# Patient Record
Sex: MALE | Race: BLACK OR AFRICAN AMERICAN | NOT HISPANIC OR LATINO
[De-identification: names, ages, dates, MRNs, and addresses within clinical notes are randomized per-mention and may not be internally consistent; named-entity substitution may affect disease eponyms.]

---

## 2019-03-25 ENCOUNTER — TRANSCRIPTION ENCOUNTER (OUTPATIENT)
Age: 44
End: 2019-03-25

## 2019-03-25 ENCOUNTER — APPOINTMENT (OUTPATIENT)
Dept: CT IMAGING | Facility: HOSPITAL | Age: 44
End: 2019-03-25

## 2019-03-25 ENCOUNTER — OUTPATIENT (OUTPATIENT)
Dept: OUTPATIENT SERVICES | Facility: HOSPITAL | Age: 44
LOS: 1 days | Discharge: HOME | End: 2019-03-25
Payer: OTHER MISCELLANEOUS

## 2019-03-25 ENCOUNTER — EMERGENCY (EMERGENCY)
Facility: HOSPITAL | Age: 44
LOS: 0 days | Discharge: ANOTHER HOSPITAL | End: 2019-03-25
Attending: EMERGENCY MEDICINE
Payer: OTHER MISCELLANEOUS

## 2019-03-25 ENCOUNTER — INPATIENT (INPATIENT)
Facility: HOSPITAL | Age: 44
LOS: 1 days | Discharge: HOME | DRG: 516 | End: 2019-03-27
Attending: SURGERY | Admitting: SURGERY
Payer: COMMERCIAL

## 2019-03-25 VITALS
DIASTOLIC BLOOD PRESSURE: 95 MMHG | TEMPERATURE: 98 F | OXYGEN SATURATION: 99 % | SYSTOLIC BLOOD PRESSURE: 160 MMHG | WEIGHT: 190.04 LBS | RESPIRATION RATE: 18 BRPM | HEART RATE: 68 BPM | HEIGHT: 71 IN

## 2019-03-25 VITALS
RESPIRATION RATE: 20 BRPM | SYSTOLIC BLOOD PRESSURE: 137 MMHG | OXYGEN SATURATION: 97 % | WEIGHT: 184.97 LBS | HEART RATE: 66 BPM | HEIGHT: 69 IN | TEMPERATURE: 98 F | DIASTOLIC BLOOD PRESSURE: 84 MMHG

## 2019-03-25 VITALS
RESPIRATION RATE: 17 BRPM | SYSTOLIC BLOOD PRESSURE: 141 MMHG | TEMPERATURE: 99 F | DIASTOLIC BLOOD PRESSURE: 83 MMHG | OXYGEN SATURATION: 99 % | HEART RATE: 62 BPM
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
## 2019-03-25 DIAGNOSIS — S32.10XA UNSPECIFIED FRACTURE OF SACRUM, INITIAL ENCOUNTER FOR CLOSED FRACTURE: ICD-10-CM

## 2019-03-25 DIAGNOSIS — S32.502A UNSPECIFIED FRACTURE OF LEFT PUBIS, INITIAL ENCOUNTER FOR CLOSED FRACTURE: ICD-10-CM

## 2019-03-25 DIAGNOSIS — M25.559 PAIN IN UNSPECIFIED HIP: ICD-10-CM
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
## 2019-03-25 DIAGNOSIS — S32.9XXA FRACTURE OF UNSPECIFIED PARTS OF LUMBOSACRAL SPINE AND PELVIS, INITIAL ENCOUNTER FOR CLOSED FRACTURE: ICD-10-CM

## 2019-03-25 DIAGNOSIS — S32.009A UNSPECIFIED FRACTURE OF UNSPECIFIED LUMBAR VERTEBRA, INITIAL ENCOUNTER FOR CLOSED FRACTURE: ICD-10-CM

## 2019-03-25 DIAGNOSIS — M54.5 LOW BACK PAIN: ICD-10-CM

## 2019-03-25 PROBLEM — Z00.00 ENCOUNTER FOR PREVENTIVE HEALTH EXAMINATION: Status: ACTIVE | Noted: 2019-03-25

## 2019-03-25 LAB
ALBUMIN SERPL ELPH-MCNC: 3.8 G/DL — SIGNIFICANT CHANGE UP (ref 3.3–5)
ALP SERPL-CCNC: 154 U/L — HIGH (ref 40–120)
ALT FLD-CCNC: 27 U/L — SIGNIFICANT CHANGE UP (ref 12–78)
ANION GAP SERPL CALC-SCNC: 3 MMOL/L — LOW (ref 5–17)
APTT BLD: 30 SEC — SIGNIFICANT CHANGE UP (ref 28.5–37)
APTT BLD: 32.5 SEC — SIGNIFICANT CHANGE UP (ref 27.5–36.3)
AST SERPL-CCNC: 20 U/L — SIGNIFICANT CHANGE UP (ref 15–37)
BASOPHILS # BLD AUTO: 0.03 K/UL — SIGNIFICANT CHANGE UP (ref 0–0.2)
BASOPHILS NFR BLD AUTO: 0.4 % — SIGNIFICANT CHANGE UP (ref 0–2)
BILIRUB SERPL-MCNC: 0.5 MG/DL — SIGNIFICANT CHANGE UP (ref 0.2–1.2)
BUN SERPL-MCNC: 16 MG/DL — SIGNIFICANT CHANGE UP (ref 7–23)
CALCIUM SERPL-MCNC: 8.4 MG/DL — LOW (ref 8.5–10.1)
CHLORIDE SERPL-SCNC: 109 MMOL/L — HIGH (ref 96–108)
CO2 SERPL-SCNC: 30 MMOL/L — SIGNIFICANT CHANGE UP (ref 22–31)
CREAT SERPL-MCNC: 1.05 MG/DL — SIGNIFICANT CHANGE UP (ref 0.5–1.3)
EOSINOPHIL # BLD AUTO: 0.13 K/UL — SIGNIFICANT CHANGE UP (ref 0–0.5)
EOSINOPHIL NFR BLD AUTO: 1.6 % — SIGNIFICANT CHANGE UP (ref 0–6)
GLUCOSE SERPL-MCNC: 93 MG/DL — SIGNIFICANT CHANGE UP (ref 70–99)
HCT VFR BLD CALC: 39.7 % — SIGNIFICANT CHANGE UP (ref 39–50)
HGB BLD-MCNC: 13.5 G/DL — SIGNIFICANT CHANGE UP (ref 13–17)
IMM GRANULOCYTES NFR BLD AUTO: 0.4 % — SIGNIFICANT CHANGE UP (ref 0–1.5)
INR BLD: 1 RATIO — SIGNIFICANT CHANGE UP (ref 0.88–1.16)
INR BLD: 1 RATIO — SIGNIFICANT CHANGE UP (ref 0.88–1.16)
LYMPHOCYTES # BLD AUTO: 2 K/UL — SIGNIFICANT CHANGE UP (ref 1–3.3)
LYMPHOCYTES # BLD AUTO: 24.9 % — SIGNIFICANT CHANGE UP (ref 13–44)
MCHC RBC-ENTMCNC: 31.6 PG — SIGNIFICANT CHANGE UP (ref 27–34)
MCHC RBC-ENTMCNC: 34 GM/DL — SIGNIFICANT CHANGE UP (ref 32–36)
MCV RBC AUTO: 93 FL — SIGNIFICANT CHANGE UP (ref 80–100)
MONOCYTES # BLD AUTO: 0.78 K/UL — SIGNIFICANT CHANGE UP (ref 0–0.9)
MONOCYTES NFR BLD AUTO: 9.7 % — SIGNIFICANT CHANGE UP (ref 2–14)
NEUTROPHILS # BLD AUTO: 5.06 K/UL — SIGNIFICANT CHANGE UP (ref 1.8–7.4)
NEUTROPHILS NFR BLD AUTO: 63 % — SIGNIFICANT CHANGE UP (ref 43–77)
NRBC # BLD: 0 /100 WBCS — SIGNIFICANT CHANGE UP (ref 0–0)
PLATELET # BLD AUTO: 199 K/UL — SIGNIFICANT CHANGE UP (ref 150–400)
POTASSIUM SERPL-MCNC: 4.3 MMOL/L — SIGNIFICANT CHANGE UP (ref 3.5–5.3)
POTASSIUM SERPL-SCNC: 4.3 MMOL/L — SIGNIFICANT CHANGE UP (ref 3.5–5.3)
PROT SERPL-MCNC: 7.1 GM/DL — SIGNIFICANT CHANGE UP (ref 6–8.3)
PROTHROM AB SERPL-ACNC: 11.2 SEC — SIGNIFICANT CHANGE UP (ref 10–12.9)
PROTHROM AB SERPL-ACNC: 11.5 SEC — SIGNIFICANT CHANGE UP (ref 10–12.9)
RBC # BLD: 4.27 M/UL — SIGNIFICANT CHANGE UP (ref 4.2–5.8)
RBC # FLD: 15.5 % — HIGH (ref 10.3–14.5)
SODIUM SERPL-SCNC: 142 MMOL/L — SIGNIFICANT CHANGE UP (ref 135–145)
WBC # BLD: 8.03 K/UL — SIGNIFICANT CHANGE UP (ref 3.8–10.5)
WBC # FLD AUTO: 8.03 K/UL — SIGNIFICANT CHANGE UP (ref 3.8–10.5)

## 2019-03-25 PROCEDURE — 99285 EMERGENCY DEPT VISIT HI MDM: CPT

## 2019-03-25 PROCEDURE — 72192 CT PELVIS W/O DYE: CPT | Mod: 26

## 2019-03-25 PROCEDURE — 99291 CRITICAL CARE FIRST HOUR: CPT

## 2019-03-25 PROCEDURE — 99253 IP/OBS CNSLTJ NEW/EST LOW 45: CPT

## 2019-03-25 PROCEDURE — 72131 CT LUMBAR SPINE W/O DYE: CPT | Mod: 26

## 2019-03-25 PROCEDURE — 99221 1ST HOSP IP/OBS SF/LOW 40: CPT | Mod: 57

## 2019-03-25 PROCEDURE — 71045 X-RAY EXAM CHEST 1 VIEW: CPT | Mod: 26

## 2019-03-25 PROCEDURE — 72170 X-RAY EXAM OF PELVIS: CPT | Mod: 26

## 2019-03-25 RX ORDER — SODIUM CHLORIDE 9 MG/ML
1000 INJECTION, SOLUTION INTRAVENOUS
Qty: 0 | Refills: 0 | Status: DISCONTINUED | OUTPATIENT
Start: 2019-03-25 | End: 2019-03-27

## 2019-03-25 RX ORDER — ACETAMINOPHEN 500 MG
650 TABLET ORAL EVERY 6 HOURS
Qty: 0 | Refills: 0 | Status: DISCONTINUED | OUTPATIENT
Start: 2019-03-25 | End: 2019-03-27

## 2019-03-25 RX ORDER — ENOXAPARIN SODIUM 100 MG/ML
30 INJECTION SUBCUTANEOUS EVERY 12 HOURS
Qty: 0 | Refills: 0 | Status: DISCONTINUED | OUTPATIENT
Start: 2019-03-25 | End: 2019-03-25

## 2019-03-25 RX ORDER — CEFAZOLIN SODIUM 1 G
2000 VIAL (EA) INJECTION ONCE
Qty: 0 | Refills: 0 | Status: DISCONTINUED | OUTPATIENT
Start: 2019-03-25 | End: 2019-03-27

## 2019-03-25 RX ORDER — NICOTINE POLACRILEX 2 MG
1 GUM BUCCAL DAILY
Qty: 0 | Refills: 0 | Status: DISCONTINUED | OUTPATIENT
Start: 2019-03-25 | End: 2019-03-27

## 2019-03-25 RX ORDER — OXYCODONE HYDROCHLORIDE 5 MG/1
5 TABLET ORAL EVERY 4 HOURS
Qty: 0 | Refills: 0 | Status: DISCONTINUED | OUTPATIENT
Start: 2019-03-25 | End: 2019-03-27

## 2019-03-25 RX ADMIN — OXYCODONE HYDROCHLORIDE 5 MILLIGRAM(S): 5 TABLET ORAL at 22:41

## 2019-03-25 NOTE — ED PROVIDER NOTE - PHYSICAL EXAMINATION
Gen: NAD, AOx3  Head: NCAT  HEENT: EOMI, oral mucosa moist, normal conjunctiva, neck supple  Lung: CTAB, no respiratory distress  CV: rrr, no murmur, Normal perfusion  Abd: soft, NTND, no CVA tenderness  MSK: No edema, no visible deformities, pelvis stable, +pain with pelvic compression, no midline spinal ttp  Neuro: No focal neurologic deficits  Skin: No rash   Psych: normal affect

## 2019-03-25 NOTE — ED ADULT NURSE NOTE - NSIMPLEMENTINTERV_GEN_ALL_ED
Implemented All Fall Risk Interventions:  [place] to call system. Call bell, personal items and telephone within reach. Instruct patient to call for assistance. Room bathroom lighting operational. Non-slip footwear when patient is off stretcher. Physically safe environment: no spills, clutter or unnecessary equipment. Stretcher in lowest position, wheels locked, appropriate side rails in place. Provide visual cue, wrist band, yellow gown, etc. Monitor gait and stability. Monitor for mental status changes and reorient to person, place, and time. Review medications for side effects contributing to fall risk. Reinforce activity limits and safety measures with patient and family.
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
## 2019-03-25 NOTE — H&P ADULT - NSHPPHYSICALEXAM_GEN_ALL_CORE
HEENT: Normocephalic, atraumatic, GLORIA, EOM wnl, no otorrhea or hemotympanum b/l, no epistaxis or d/c b/l nares, no craniofacial bony pathology or tenderness b/l  Neck: Pt in hard cervical collar at time of exam. No crepitus, no ecchymosis, no hematoma, to exam, no JVD, no tracheal deviation  Cspine/thoracolumbrosacral spine: no gross bony pathology or tenderness to exam  Cardiovascular: S1S2 Present  Chest: no gross rib pathology or tenderness to exam. No sternal pathology or tenderness to exam. No crepitus, no ecchymosis, no hematoma. No penetrating thorcoabdominal trauma  Respiratory: Rate is 18; Respiratory Effort normal; no wheezes, rales or rhonchi to exam  ABD: bowel sounds (+), soft, nontender, non distended, no rebound, no gaurding, no rigidity, no skin changes to exam. No pelvic instability to exam, no skin changes  Musculoskeletal: edema noted to left hip area, tender to palpation. unable to perform straight leg raise 2/2 to pain bilaterally   Vascular: Palpable bilateral DP/PT pulses  Skin: no lesions or rashes to exam HEENT: Normocephalic, atraumatic, GLORIA, EOM wnl, no otorrhea or hemotympanum b/l, no epistaxis or d/c b/l nares, no craniofacial bony pathology or tenderness b/l  Neck: Pt in hard cervical collar at time of exam. No crepitus, no ecchymosis, no hematoma, to exam, no JVD, no tracheal deviation  Cspine/thoracolumbrosacral spine: no gross bony pathology or tenderness to exam  Cardiovascular: S1S2 Present  Chest: no gross rib pathology or tenderness to exam. No sternal pathology or tenderness to exam. No crepitus, no ecchymosis, no hematoma. No penetrating thorcoabdominal trauma  Respiratory: Rate is 18; Respiratory Effort normal; no wheezes, rales or rhonchi to exam  ABD: bowel sounds (+), soft, nontender, non distended, no rebound, no guarding, no rigidity, no skin changes to exam. No pelvic instability to exam, no skin changes  Musculoskeletal: edema noted to left hip area, tender to palpation. unable to perform straight leg raise 2/2 to pain bilaterally   Vascular: Palpable bilateral DP/PT pulses  Skin: no lesions or rashes to exam

## 2019-03-25 NOTE — CONSULT NOTE ADULT - ASSESSMENT
44yo M w/ Sacral Fx, B/l Acetabulum Fx, R Sup/Inf [place] Fx  Ordered CT LSp  Pain Control  Pt s/e with Dr. Jennings at bedside, will advise of final plan pending imaging 42yo M w/ Sacral Fx, B/l Acetabulum Fx, R Sup/Inf [place] Fx, BL L5 TP Fx  Pain Control  NWB BLLE   DVT PPx: SCDs  Imaging reviewed with attending, significant for sacral fracture that would benefit from definitive operative fixation  Pt discussed with Dr. Godinez (Orthopaedic) at [facility] who agreed to do ED to ED transfer  Imaging findings discussed with patient at bedside who had adequate time to have all questions answered and agreed with above plan  Further orthopaedic surgical intervention to be performed at [facility]  Pt s/e with Dr. Jennings at bedside,who is aware and agrees with above plan  Ortho stable for DC/Transfer
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
## 2019-03-25 NOTE — CONSULT NOTE ADULT - ASSESSMENT
44yo M w/ Sacral Fx, B/l Acetabulum Fx, R Sup/Inf [place] Fx  Ordered CT LSp  Pain Control  Pt s/e with Dr. Jennings at bedside, will advise of final plan pending imaging 44yo M w/ Sacral Fx, B/l Acetabulum Fx, R Sup/Inf [place] Fx  Ordered CT LSp  Pain Control  Pt s/e with Dr. Jennings at bedside, will advise of final plan pending imaging
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
## 2019-03-25 NOTE — ED PROVIDER NOTE - NS ED ROS FT
ROS: no CP/SOB. no cough. no fever. no n/v/d/c. no abd pain. no rash. no bleeding. no urinary complaints. no weakness. no vision changes. no HA. +back pain. no extremity swelling/deformity. No change in mental status.

## 2019-03-25 NOTE — ED ADULT NURSE NOTE - NSIMPLEMENTINTERV_GEN_ALL_ED
Implemented All Universal Safety Interventions:  Starlight to call system. Call bell, personal items and telephone within reach. Instruct patient to call for assistance. Room bathroom lighting operational. Non-slip footwear when patient is off stretcher. Physically safe environment: no spills, clutter or unnecessary equipment. Stretcher in lowest position, wheels locked, appropriate side rails in place.
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
## 2019-03-25 NOTE — CONSULT NOTE ADULT - SUBJECTIVE AND OBJECTIVE BOX
Pt Name: MELINA DEE    MRN: [id number]      Patient is a 43y Male presenting to the emergency department with a chief complaint of b/l pelvic pain x 1 month. Pt states that 1 month ago he was at work when he was pinned between a truck tire and railing on both sides of his body. Pt has been ambulating with crutches since the injury with intermittent pain reported. Pt was seen at the [place] ED today where a CT showed multiple pelvic fractures/ L5 TP fxs and was transferred to [facility] ED for further evaluation. Pt otherwise reported mild tingling sensation of his right foot but otherwise denies fever/chills, c/p, sob, abdominal pain, n/v, numbness and has no other complaints.  .      REVIEW OF SYSTEMS    General: Alert, responsive, in NAD    Skin/Breast: No rashes, no pruritis   	  Ophthalmologic: No visual changes. No redness.   	  ENMT:	No discharge. No swelling.    Respiratory and Thorax: No difficulty breathing. No cough.  	   Cardiovascular:	No chest pain. No palpitations.    Gastrointestinal:	 No abdominal pain. No diarrhea.     Genitourinary: No dysuria. No bleeding.    Musculoskeletal: SEE HPI.    Neurological: No sensory or motor changes.     Psychiatric: No anxiety or depression.    Hematology/Lymphatics: No swelling.    Endocrine: No Hx of diabetes.    ROS is otherwise negative.    PAST MEDICAL & SURGICAL HISTORY:  PAST MEDICAL & SURGICAL HISTORY:  No pertinent past medical history  No significant past surgical history      Allergies: No Known Allergies      Medications: acetaminophen   Tablet .. 650 milliGRAM(s) Oral every 6 hours PRN  enoxaparin Injectable 30 milliGRAM(s) SubCutaneous every 12 hours  lactated ringers. 1000 milliLiter(s) IV Continuous <Continuous>  nicotine -  14 mG/24Hr(s) Patch 1 patch Transdermal daily  oxyCODONE    IR 5 milliGRAM(s) Oral every 4 hours PRN      FAMILY HISTORY:  : non-contributory    Social History: Denies ETOH abuse.    Ambulation: Walking independently [ x ] With Cane [ ] With Walker [ ]  Bedbound [ ]                           13.5   8.03  )-----------( 199      ( 25 Mar 2019 13:16 )             39.7       03-25    142  |  109<H>  |  16  ----------------------------<  93  4.3   |  30  |  1.05    Ca    8.4<L>      25 Mar 2019 13:16    TPro  7.1  /  Alb  3.8  /  TBili  0.5  /  DBili  x   /  AST  20  /  ALT  27  /  AlkPhos  154<H>  03-25      Vital Signs Last 24 Hrs  T(C): 36.7 (25 Mar 2019 19:31), Max: 37.1 (25 Mar 2019 18:40)  T(F): 98 (25 Mar 2019 19:31), Max: 98.8 (25 Mar 2019 18:40)  HR: 66 (25 Mar 2019 19:31) (56 - 68)  BP: 137/84 (25 Mar 2019 19:31) (137/84 - 160/95)  BP(mean): --  RR: 20 (25 Mar 2019 19:31) (17 - 20)  SpO2: 97% (25 Mar 2019 19:31) (97% - 99%)    Daily Height in cm: 175.26 (25 Mar 2019 19:31)    Daily       PHYSICAL EXAM:      Appearance: Alert, responsive, in no acute distress.    Neurological: Sensation is grossly intact to light touch. +tingling sensation reported on the right dorsal foot.     Skin: no rash on visible skin. Skin is clean, dry and intact. No bleeding. No abrasions. No ulcerations.    Vascular: 2+ distal radial/DP/PT pulses. Cap refill < 2 sec. No signs of venous insufficiency or stasis. No extremity ulcerations. No cyanosis.    Musculoskeletal:         Left Upper Extremity:  + NROM. Non-tender. No signs of trauma.        Right Upper Extremity:  + NROM. Non-tender. No signs of trauma.        Left Lower Extremity: +plantar/dorsiflexion/EHL/FHL intact, Decreased ROM of RLE due to reported pelvic pain, compartments soft and compressible, 2+ DP pulse, SILT.       Right Lower Extremity: +plantar/dorsiflexion/FHL intact, EHL NOT intact, Decreased ROM of RLE due to reported pelvic pain, compartments soft and compressible, 2+ DP pulse, SILT with mild tingling reported on the right foot dorsum.    Imaging Studies: All imaging reviewed by Dr. Godinez.    < from: CT Pelvis No Cont (03.25.19 @ 09:26) >  EXAM:  CT PELVIS ONLY                            PROCEDURE DATE:  03/25/2019          INTERPRETATION:  Exam Date: 3/25/2019 9:26 AM    CT bony pelvis         CLINICAL INFORMATION: Bony pelvis pain  R/O FRACTURE    TECHNIQUE: Contiguous axial CT sections of the pelvis were obtained.   Coronal and sagittal reformats were obtained.    FINDINGS:   No prior similar studies are available for review.    There are numerous acute fractures of the sacrum and pelvis as follows.     There is a comminuted sacral fracture involving the central portion of   the sacrum and involving he posterior elements of the sacrum along the   midline. There is suggestion of 2 small cortical fractures along the   posterior inferior endplate of L5. No bony retropulsion is present.    There are comminuted fractures of the bilateral anterior acetabuli. The   bilateral femoral heads remain located. No fractures of the visualized   portions of the femurs.    There is a distracted fracture of the left medial superior pubic ramus   adjacent to the symphysis pubis. There is a distracted fracture of the   left lateral inferior pubic ramus. There is a distracted fracture of the   right lateral inferior pubic ramus.    Bladder wall thickening is suggested. No free fluid orfree air within   the lower abdomen and pelvis. Visualized portions of the bowel appear   unremarkable.    There is a fluid collection within the deep soft tissues of the left hip,   lateral to the musculature, measuring approximately 1.7 cm in widthx 14   cm in cc dimension.    There is a small amount of posttraumatic inflammatory changes within the   subcutaneous tissues of the right lateral hip.    Impression:    Numerous acute comminuted fractures of the sacrum and pelvis, involving   the sacrum, inferior endplate of L5, bilateral acetabuli, bilateral   inferior pubic rami, and left superior pubic ramus, as detailed above.   Soft tissue changes as above.    Patient was sent to the ED for treatment.      Critical value:  I discussed the finding of this report with Dr. Jennings at 9:50 AM on March 25, 2019.  Critical value policy of the   hospital was followed.  Read back and confirmation of receipt of this   communication was performed.  This verbal communication supplements the  text report of this document.                                  SANDY TERRELL M.D., ATTENDING RADIOLOGIST  This document has been electronically signed. Mar 25 2019  9:54AM        < end of copied text >      < from: CT Lumbar Spine No Cont (03.25.19 @ 15:41) >    EXAM:  CT LUMBAR SPINE                          EXAM:  CT 3D RECONSTRUCT W HAIR                            PROCEDURE DATE:  03/25/2019          INTERPRETATION:  CT lumbar spine without contrast    History L5 radiculopathy    Please refer to the accompanying report of the pelvis for description of   pelvic fractures. There are tiny nondisplaced fractures of the transverse   processes of L5 on both sides.    There is no vertebral body fracture, compression, subluxation or pars   defect. The disc spaces are maintained. There is no CT evidence of focal   disc protrusion or spinal stenosis or epidural mass or collection. There   is mild facet arthropathy on the right at L4-L5.    IMPRESSION: Transverse process fractures at L5 without spinalcanal or   vertebral column abnormality. Please refer to accompanying report of the   pelvis                LANA PATRICIA M.D., ATTENDING RADIOLOGIST  This document has been electronically signed. Mar 25 2019  4:04PM    < end of copied text >      A/P:  Pt is a  43y Male transfer from [place] ED with multiple pelvic fractures & L5 TP fx as described in the CT scan report above x 1 month ago.    PLAN d/w Dr. Adkins:   * Pain control as clinically indicated  * MRI lumbar spine ordered  * Further plan pending review of pending images by Dr. Adkins Pt Name: MELINA DEE    MRN: [id number]      Patient is a 43y Male presenting to the emergency department with a chief complaint of b/l pelvic pain x 1 month. Pt states that 1 month ago he was at work when he was pinned between a truck tire and railing on both sides of his body. Pt has been ambulating with crutches since the injury with intermittent pain reported. Pt was seen at the [place] ED today where a CT showed multiple pelvic fractures/ L5 TP fxs and was transferred to [facility] ED for further evaluation. Pt otherwise reported mild tingling sensation of his right foot but otherwise denies fever/chills, c/p, sob, abdominal pain, n/v, numbness and has no other complaints.  .      REVIEW OF SYSTEMS    General: Alert, responsive, in NAD    Skin/Breast: No rashes, no pruritis   	  Ophthalmologic: No visual changes. No redness.   	  ENMT:	No discharge. No swelling.    Respiratory and Thorax: No difficulty breathing. No cough.  	   Cardiovascular:	No chest pain. No palpitations.    Gastrointestinal:	 No abdominal pain. No diarrhea.     Genitourinary: No dysuria. No bleeding.    Musculoskeletal: SEE HPI.    Neurological: No sensory or motor changes.     Psychiatric: No anxiety or depression.    Hematology/Lymphatics: No swelling.    Endocrine: No Hx of diabetes.    ROS is otherwise negative.    PAST MEDICAL & SURGICAL HISTORY:  PAST MEDICAL & SURGICAL HISTORY:  No pertinent past medical history  No significant past surgical history      Allergies: No Known Allergies      Medications: acetaminophen   Tablet .. 650 milliGRAM(s) Oral every 6 hours PRN  enoxaparin Injectable 30 milliGRAM(s) SubCutaneous every 12 hours  lactated ringers. 1000 milliLiter(s) IV Continuous <Continuous>  nicotine -  14 mG/24Hr(s) Patch 1 patch Transdermal daily  oxyCODONE    IR 5 milliGRAM(s) Oral every 4 hours PRN      FAMILY HISTORY:  : non-contributory    Social History: Denies ETOH abuse.    Ambulation: Walking independently [ x ] With Cane [ ] With Walker [ ]  Bedbound [ ]                           13.5   8.03  )-----------( 199      ( 25 Mar 2019 13:16 )             39.7       03-25    142  |  109<H>  |  16  ----------------------------<  93  4.3   |  30  |  1.05    Ca    8.4<L>      25 Mar 2019 13:16    TPro  7.1  /  Alb  3.8  /  TBili  0.5  /  DBili  x   /  AST  20  /  ALT  27  /  AlkPhos  154<H>  03-25      Vital Signs Last 24 Hrs  T(C): 36.7 (25 Mar 2019 19:31), Max: 37.1 (25 Mar 2019 18:40)  T(F): 98 (25 Mar 2019 19:31), Max: 98.8 (25 Mar 2019 18:40)  HR: 66 (25 Mar 2019 19:31) (56 - 68)  BP: 137/84 (25 Mar 2019 19:31) (137/84 - 160/95)  BP(mean): --  RR: 20 (25 Mar 2019 19:31) (17 - 20)  SpO2: 97% (25 Mar 2019 19:31) (97% - 99%)    Daily Height in cm: 175.26 (25 Mar 2019 19:31)    Daily       PHYSICAL EXAM:      Appearance: Alert, responsive, in no acute distress.    Neurological: Sensation is grossly intact to light touch. +tingling sensation reported on the right dorsal foot.     Skin: no rash on visible skin. Skin is clean, dry and intact. No bleeding. No abrasions. No ulcerations.    Vascular: 2+ distal radial/DP/PT pulses. Cap refill < 2 sec. No signs of venous insufficiency or stasis. No extremity ulcerations. No cyanosis.    Musculoskeletal:         Left Upper Extremity:  + NROM. Non-tender. No signs of trauma.        Right Upper Extremity:  + NROM. Non-tender. No signs of trauma.        Left Lower Extremity: +plantar/dorsiflexion/EHL/FHL intact, Decreased ROM of RLE due to reported pelvic pain, compartments soft and compressible, 2+ DP pulse, SILT.       Right Lower Extremity: +plantar/dorsiflexion/FHL intact, EHL NOT intact, Decreased ROM of RLE due to reported pelvic pain, compartments soft and compressible, 2+ DP pulse, SILT with mild tingling reported on the right foot dorsum.    Imaging Studies:    < from: CT Pelvis No Cont (03.25.19 @ 09:26) >  EXAM:  CT PELVIS ONLY                            PROCEDURE DATE:  03/25/2019          INTERPRETATION:  Exam Date: 3/25/2019 9:26 AM    CT bony pelvis         CLINICAL INFORMATION: Bony pelvis pain  R/O FRACTURE    TECHNIQUE: Contiguous axial CT sections of the pelvis were obtained.   Coronal and sagittal reformats were obtained.    FINDINGS:   No prior similar studies are available for review.    There are numerous acute fractures of the sacrum and pelvis as follows.     There is a comminuted sacral fracture involving the central portion of   the sacrum and involving he posterior elements of the sacrum along the   midline. There is suggestion of 2 small cortical fractures along the   posterior inferior endplate of L5. No bony retropulsion is present.    There are comminuted fractures of the bilateral anterior acetabuli. The   bilateral femoral heads remain located. No fractures of the visualized   portions of the femurs.    There is a distracted fracture of the left medial superior pubic ramus   adjacent to the symphysis pubis. There is a distracted fracture of the   left lateral inferior pubic ramus. There is a distracted fracture of the   right lateral inferior pubic ramus.    Bladder wall thickening is suggested. No free fluid orfree air within   the lower abdomen and pelvis. Visualized portions of the bowel appear   unremarkable.    There is a fluid collection within the deep soft tissues of the left hip,   lateral to the musculature, measuring approximately 1.7 cm in widthx 14   cm in cc dimension.    There is a small amount of posttraumatic inflammatory changes within the   subcutaneous tissues of the right lateral hip.    Impression:    Numerous acute comminuted fractures of the sacrum and pelvis, involving   the sacrum, inferior endplate of L5, bilateral acetabuli, bilateral   inferior pubic rami, and left superior pubic ramus, as detailed above.   Soft tissue changes as above.    Patient was sent to the ED for treatment.      Critical value:  I discussed the finding of this report with Dr. Jennings at 9:50 AM on March 25, 2019.  Critical value policy of the   hospital was followed.  Read back and confirmation of receipt of this   communication was performed.  This verbal communication supplements the  text report of this document.                                  SANDY TERRELL M.D., ATTENDING RADIOLOGIST  This document has been electronically signed. Mar 25 2019  9:54AM        < end of copied text >      < from: CT Lumbar Spine No Cont (03.25.19 @ 15:41) >    EXAM:  CT LUMBAR SPINE                          EXAM:  CT 3D RECONSTRUCT W HAIR                            PROCEDURE DATE:  03/25/2019          INTERPRETATION:  CT lumbar spine without contrast    History L5 radiculopathy    Please refer to the accompanying report of the pelvis for description of   pelvic fractures. There are tiny nondisplaced fractures of the transverse   processes of L5 on both sides.    There is no vertebral body fracture, compression, subluxation or pars   defect. The disc spaces are maintained. There is no CT evidence of focal   disc protrusion or spinal stenosis or epidural mass or collection. There   is mild facet arthropathy on the right at L4-L5.    IMPRESSION: Transverse process fractures at L5 without spinalcanal or   vertebral column abnormality. Please refer to accompanying report of the   pelvis                LANA PATRICIA M.D., ATTENDING RADIOLOGIST  This document has been electronically signed. Mar 25 2019  4:04PM    < end of copied text >      A/P:  Pt is a  43y Male transfer from [place] ED with multiple pelvic fractures & L5 TP fx as described in the CT scan report above x 1 month ago.    PLAN d/w Dr. Adkins:   * Pain control as clinically indicated  * MRI lumbar spine ordered  * Further plan pending review of pending images by Dr. Adkins Pt Name: MELINA DEE    MRN: [id number]      Patient is a 43y Male presenting to the emergency department with a chief complaint of b/l pelvic pain x 1 month. Pt states that 1 month ago he was at work when he was pinned between a truck tire and railing on both sides of his body. Pt has been ambulating with crutches since the injury with intermittent pain reported. Pt was seen at the [place] ED today where a CT showed multiple pelvic fractures/ L5 TP fxs and was transferred to [facility] ED for further evaluation. Pt otherwise reported mild tingling sensation of his right foot but otherwise denies fever/chills, c/p, sob, abdominal pain, n/v, numbness and has no other complaints.  .      REVIEW OF SYSTEMS    General: Alert, responsive, in NAD    Skin/Breast: No rashes, no pruritis   	  Ophthalmologic: No visual changes. No redness.   	  ENMT:	No discharge. No swelling.    Respiratory and Thorax: No difficulty breathing. No cough.  	   Cardiovascular:	No chest pain. No palpitations.    Gastrointestinal:	 No abdominal pain. No diarrhea.     Genitourinary: No dysuria. No bleeding.    Musculoskeletal: SEE HPI.    Neurological: No sensory or motor changes.     Psychiatric: No anxiety or depression.    Hematology/Lymphatics: No swelling.    Endocrine: No Hx of diabetes.    ROS is otherwise negative.    PAST MEDICAL & SURGICAL HISTORY:  PAST MEDICAL & SURGICAL HISTORY:  No pertinent past medical history  No significant past surgical history      Allergies: No Known Allergies      Medications: acetaminophen   Tablet .. 650 milliGRAM(s) Oral every 6 hours PRN  enoxaparin Injectable 30 milliGRAM(s) SubCutaneous every 12 hours  lactated ringers. 1000 milliLiter(s) IV Continuous <Continuous>  nicotine -  14 mG/24Hr(s) Patch 1 patch Transdermal daily  oxyCODONE    IR 5 milliGRAM(s) Oral every 4 hours PRN      FAMILY HISTORY:  : non-contributory    Social History: Denies ETOH abuse.    Ambulation: Walking independently [ x ] With Cane [ ] With Walker [ ]  Bedbound [ ]                           13.5   8.03  )-----------( 199      ( 25 Mar 2019 13:16 )             39.7       03-25    142  |  109<H>  |  16  ----------------------------<  93  4.3   |  30  |  1.05    Ca    8.4<L>      25 Mar 2019 13:16    TPro  7.1  /  Alb  3.8  /  TBili  0.5  /  DBili  x   /  AST  20  /  ALT  27  /  AlkPhos  154<H>  03-25      Vital Signs Last 24 Hrs  T(C): 36.7 (25 Mar 2019 19:31), Max: 37.1 (25 Mar 2019 18:40)  T(F): 98 (25 Mar 2019 19:31), Max: 98.8 (25 Mar 2019 18:40)  HR: 66 (25 Mar 2019 19:31) (56 - 68)  BP: 137/84 (25 Mar 2019 19:31) (137/84 - 160/95)  BP(mean): --  RR: 20 (25 Mar 2019 19:31) (17 - 20)  SpO2: 97% (25 Mar 2019 19:31) (97% - 99%)    Daily Height in cm: 175.26 (25 Mar 2019 19:31)    Daily       PHYSICAL EXAM:      Appearance: Alert, responsive, in no acute distress.    Neurological: Sensation is grossly intact to light touch. +tingling sensation reported on the right dorsal foot.     Skin: no rash on visible skin. Skin is clean, dry and intact. No bleeding. No abrasions. No ulcerations.    Vascular: 2+ distal radial/DP/PT pulses. Cap refill < 2 sec. No signs of venous insufficiency or stasis. No extremity ulcerations. No cyanosis.    Musculoskeletal:         Left Upper Extremity:  + NROM. Non-tender. No signs of trauma.        Right Upper Extremity:  + NROM. Non-tender. No signs of trauma.        Left Lower Extremity: +plantar/dorsiflexion/EHL/FHL intact, Decreased ROM of RLE due to reported pelvic pain, compartments soft and compressible, 2+ DP pulse, SILT.       Right Lower Extremity: +plantar/dorsiflexion/FHL intact, EHL NOT intact, Decreased ROM of RLE due to reported pelvic pain, compartments soft and compressible, 2+ DP pulse, SILT with mild tingling reported on the right foot dorsum.    Imaging Studies:    < from: CT Pelvis No Cont (03.25.19 @ 09:26) >  EXAM:  CT PELVIS ONLY                            PROCEDURE DATE:  03/25/2019          INTERPRETATION:  Exam Date: 3/25/2019 9:26 AM    CT bony pelvis         CLINICAL INFORMATION: Bony pelvis pain  R/O FRACTURE    TECHNIQUE: Contiguous axial CT sections of the pelvis were obtained.   Coronal and sagittal reformats were obtained.    FINDINGS:   No prior similar studies are available for review.    There are numerous acute fractures of the sacrum and pelvis as follows.     There is a comminuted sacral fracture involving the central portion of   the sacrum and involving he posterior elements of the sacrum along the   midline. There is suggestion of 2 small cortical fractures along the   posterior inferior endplate of L5. No bony retropulsion is present.    There are comminuted fractures of the bilateral anterior acetabuli. The   bilateral femoral heads remain located. No fractures of the visualized   portions of the femurs.    There is a distracted fracture of the left medial superior pubic ramus   adjacent to the symphysis pubis. There is a distracted fracture of the   left lateral inferior pubic ramus. There is a distracted fracture of the   right lateral inferior pubic ramus.    Bladder wall thickening is suggested. No free fluid orfree air within   the lower abdomen and pelvis. Visualized portions of the bowel appear   unremarkable.    There is a fluid collection within the deep soft tissues of the left hip,   lateral to the musculature, measuring approximately 1.7 cm in widthx 14   cm in cc dimension.    There is a small amount of posttraumatic inflammatory changes within the   subcutaneous tissues of the right lateral hip.    Impression:    Numerous acute comminuted fractures of the sacrum and pelvis, involving   the sacrum, inferior endplate of L5, bilateral acetabuli, bilateral   inferior pubic rami, and left superior pubic ramus, as detailed above.   Soft tissue changes as above.    Patient was sent to the ED for treatment.      Critical value:  I discussed the finding of this report with Dr. Jennings at 9:50 AM on March 25, 2019.  Critical value policy of the   hospital was followed.  Read back and confirmation of receipt of this   communication was performed.  This verbal communication supplements the  text report of this document.                                  SANDY TERRELL M.D., ATTENDING RADIOLOGIST  This document has been electronically signed. Mar 25 2019  9:54AM        < end of copied text >      < from: CT Lumbar Spine No Cont (03.25.19 @ 15:41) >    EXAM:  CT LUMBAR SPINE                          EXAM:  CT 3D RECONSTRUCT W HAIR                            PROCEDURE DATE:  03/25/2019          INTERPRETATION:  CT lumbar spine without contrast    History L5 radiculopathy    Please refer to the accompanying report of the pelvis for description of   pelvic fractures. There are tiny nondisplaced fractures of the transverse   processes of L5 on both sides.    There is no vertebral body fracture, compression, subluxation or pars   defect. The disc spaces are maintained. There is no CT evidence of focal   disc protrusion or spinal stenosis or epidural mass or collection. There   is mild facet arthropathy on the right at L4-L5.    IMPRESSION: Transverse process fractures at L5 without spinalcanal or   vertebral column abnormality. Please refer to accompanying report of the   pelvis                LANA PATRICIA M.D., ATTENDING RADIOLOGIST  This document has been electronically signed. Mar 25 2019  4:04PM    < end of copied text >      A/P:  Pt is a  43y Male transfer from [place] ED with multiple pelvic fractures & L5 TP fx as described in the CT scan report above x 1 month ago.    PLAN d/w Dr. Adkins:   * Pain control as clinically indicated  * MRI lumbar spine ordered - Pt was at MRI- MRI tech states patient refusing MRI. Explained to him that MRI images are needed for Dr. Adkins to further evaluate fracture. Pt again refuses and states that he already has outpatient mri done and does not want another. Dr. Adkins made aware.  * Further plan pending review of pending images by Dr. Adkins

## 2019-03-25 NOTE — ED ADULT NURSE NOTE - OBJECTIVE STATEMENT
Patient had an accident on the job where he was crushed between a tire and a gate. Patient has been experiencing left and right hip, groin, back and right foot pain. Patient states unable to move right great toe. Patient alert and oriented, no signs of distress. Patient states visiting ortho doctor who sent him here for CT scan.

## 2019-03-25 NOTE — ED PROVIDER NOTE - CLINICAL SUMMARY MEDICAL DECISION MAKING FREE TEXT BOX
patient called as Trauma b, due to transfer status. canceled on arrival. no new trauma. patient stable. multiple pelvic fractures. Dr. Mcallister to admit. Ortho to be consulted.

## 2019-03-25 NOTE — ED PROVIDER NOTE - CARE PLAN
Principal Discharge DX:	Fracture of ramus of left pubis  Secondary Diagnosis:	Fracture of lumbar vertebra  Secondary Diagnosis:	Fracture of sacrum

## 2019-03-25 NOTE — ED PROVIDER NOTE - PROGRESS NOTE DETAILS
ortho is notified. ortho sts Dr. Godinez was the ortho cared for pt initially and he wants pt transferred to [facility] Transfer center is notified and Dr. Godinez sts call trauma surgeon to accept pt. Dr. Nolasco surgeon is notified and accepts pt.

## 2019-03-25 NOTE — CONSULT NOTE ADULT - SUBJECTIVE AND OBJECTIVE BOX
Pt Name: MELINA DEE    MRN: [id number]      Patient is a 43y Male presenting to the emergency department with a chief complaint of b/l pelvic pain x 1 month. Pt states that 1 month ago he was at work when he was pinned between a truck tire and railing on both sides of his body. Pt has been ambulating with crutches since the injury with intermittent pain reported. Pt was seen at the [place] ED today where a CT showed multiple pelvic fractures and was transferred to [facility] ED for further evaluation. Pt otherwise reported mild tingling sensation of his right foot but otherwise denies fever/chills, c/p, sob, abdominal pain, n/v, numbness and has no other complaints.  .      REVIEW OF SYSTEMS    General: Alert, responsive, in NAD    Skin/Breast: No rashes, no pruritis   	  Ophthalmologic: No visual changes. No redness.   	  ENMT:	No discharge. No swelling.    Respiratory and Thorax: No difficulty breathing. No cough.  	   Cardiovascular:	No chest pain. No palpitations.    Gastrointestinal:	 No abdominal pain. No diarrhea.     Genitourinary: No dysuria. No bleeding.    Musculoskeletal: SEE HPI.    Neurological: No sensory or motor changes.     Psychiatric: No anxiety or depression.    Hematology/Lymphatics: No swelling.    Endocrine: No Hx of diabetes.    ROS is otherwise negative.    PAST MEDICAL & SURGICAL HISTORY:  PAST MEDICAL & SURGICAL HISTORY:  No pertinent past medical history  No significant past surgical history      Allergies: No Known Allergies      Medications: acetaminophen   Tablet .. 650 milliGRAM(s) Oral every 6 hours PRN  enoxaparin Injectable 30 milliGRAM(s) SubCutaneous every 12 hours  lactated ringers. 1000 milliLiter(s) IV Continuous <Continuous>  nicotine -  14 mG/24Hr(s) Patch 1 patch Transdermal daily  oxyCODONE    IR 5 milliGRAM(s) Oral every 4 hours PRN      FAMILY HISTORY:  : non-contributory    Social History: Denies ETOH abuse.    Ambulation: Walking independently [ x ] With Cane [ ] With Walker [ ]  Bedbound [ ]                           13.5   8.03  )-----------( 199      ( 25 Mar 2019 13:16 )             39.7       03-25    142  |  109<H>  |  16  ----------------------------<  93  4.3   |  30  |  1.05    Ca    8.4<L>      25 Mar 2019 13:16    TPro  7.1  /  Alb  3.8  /  TBili  0.5  /  DBili  x   /  AST  20  /  ALT  27  /  AlkPhos  154<H>  03-25      Vital Signs Last 24 Hrs  T(C): 36.7 (25 Mar 2019 19:31), Max: 37.1 (25 Mar 2019 18:40)  T(F): 98 (25 Mar 2019 19:31), Max: 98.8 (25 Mar 2019 18:40)  HR: 66 (25 Mar 2019 19:31) (56 - 68)  BP: 137/84 (25 Mar 2019 19:31) (137/84 - 160/95)  BP(mean): --  RR: 20 (25 Mar 2019 19:31) (17 - 20)  SpO2: 97% (25 Mar 2019 19:31) (97% - 99%)    Daily Height in cm: 175.26 (25 Mar 2019 19:31)    Daily       PHYSICAL EXAM:      Appearance: Alert, responsive, in no acute distress.    Neurological: Sensation is grossly intact to light touch. +tingling sensation reported on the right dorsal foot.     Skin: no rash on visible skin. Skin is clean, dry and intact. No bleeding. No abrasions. No ulcerations.    Vascular: 2+ distal radial/DP/PT pulses. Cap refill < 2 sec. No signs of venous insufficiency or stasis. No extremity ulcerations. No cyanosis.    Musculoskeletal:         Left Upper Extremity:  + NROM. Non-tender. No signs of trauma.        Right Upper Extremity:  + NROM. Non-tender. No signs of trauma.        Left Lower Extremity: +plantar/dorsiflexion/FHL intact, EHL NOT intact, Decreased ROM of RLE due to reported pelvic pain, compartments soft and compressible, 2+ DP pulse, SILT with mild tingling reported on the right foot dorsum.       Right Lower Extremity: +plantar/dorsiflexion/EHL/FHL intact, Decreased ROM of RLE due to reported pelvic pain, compartments soft and compressible, 2+ DP pulse, SILT.    Imaging Studies: All imaging reviewed by Dr. Godinez.    A/P:  Pt is a  43y Male transfer from [facility] with multiple pelvic fractures as described in the CT scan report above x 1 month ago.    PLAN:   * NPO after midnight for OR tomorrow  * Medical clearance requested   * Pre op antibiotics ordered  * IV fluids once patient is npo  * Pelvis xrays ordered  * Pain control as clinically indicated  * Continue care as per primary team  * Further plan pending review of all imaging by Dr. Godinez Pt Name: MELINA DEE    MRN: [id number]      Patient is a 43y Male presenting to the emergency department with a chief complaint of b/l pelvic pain x 1 month. Pt states that 1 month ago he was at work when he was pinned between a truck tire and railing on both sides of his body. Pt has been ambulating with crutches since the injury with intermittent pain reported. Pt was seen at the [place] ED today where a CT showed multiple pelvic fractures and was transferred to [facility] ED for further evaluation. Pt otherwise reported mild tingling sensation of his right foot but otherwise denies fever/chills, c/p, sob, abdominal pain, n/v, numbness and has no other complaints.  .      REVIEW OF SYSTEMS    General: Alert, responsive, in NAD    Skin/Breast: No rashes, no pruritis   	  Ophthalmologic: No visual changes. No redness.   	  ENMT:	No discharge. No swelling.    Respiratory and Thorax: No difficulty breathing. No cough.  	   Cardiovascular:	No chest pain. No palpitations.    Gastrointestinal:	 No abdominal pain. No diarrhea.     Genitourinary: No dysuria. No bleeding.    Musculoskeletal: SEE HPI.    Neurological: No sensory or motor changes.     Psychiatric: No anxiety or depression.    Hematology/Lymphatics: No swelling.    Endocrine: No Hx of diabetes.    ROS is otherwise negative.    PAST MEDICAL & SURGICAL HISTORY:  PAST MEDICAL & SURGICAL HISTORY:  No pertinent past medical history  No significant past surgical history      Allergies: No Known Allergies      Medications: acetaminophen   Tablet .. 650 milliGRAM(s) Oral every 6 hours PRN  enoxaparin Injectable 30 milliGRAM(s) SubCutaneous every 12 hours  lactated ringers. 1000 milliLiter(s) IV Continuous <Continuous>  nicotine -  14 mG/24Hr(s) Patch 1 patch Transdermal daily  oxyCODONE    IR 5 milliGRAM(s) Oral every 4 hours PRN      FAMILY HISTORY:  : non-contributory    Social History: Denies ETOH abuse.    Ambulation: Walking independently [ x ] With Cane [ ] With Walker [ ]  Bedbound [ ]                           13.5   8.03  )-----------( 199      ( 25 Mar 2019 13:16 )             39.7       03-25    142  |  109<H>  |  16  ----------------------------<  93  4.3   |  30  |  1.05    Ca    8.4<L>      25 Mar 2019 13:16    TPro  7.1  /  Alb  3.8  /  TBili  0.5  /  DBili  x   /  AST  20  /  ALT  27  /  AlkPhos  154<H>  03-25      Vital Signs Last 24 Hrs  T(C): 36.7 (25 Mar 2019 19:31), Max: 37.1 (25 Mar 2019 18:40)  T(F): 98 (25 Mar 2019 19:31), Max: 98.8 (25 Mar 2019 18:40)  HR: 66 (25 Mar 2019 19:31) (56 - 68)  BP: 137/84 (25 Mar 2019 19:31) (137/84 - 160/95)  BP(mean): --  RR: 20 (25 Mar 2019 19:31) (17 - 20)  SpO2: 97% (25 Mar 2019 19:31) (97% - 99%)    Daily Height in cm: 175.26 (25 Mar 2019 19:31)    Daily       PHYSICAL EXAM:      Appearance: Alert, responsive, in no acute distress.    Neurological: Sensation is grossly intact to light touch. +tingling sensation reported on the right dorsal foot.     Skin: no rash on visible skin. Skin is clean, dry and intact. No bleeding. No abrasions. No ulcerations.    Vascular: 2+ distal radial/DP/PT pulses. Cap refill < 2 sec. No signs of venous insufficiency or stasis. No extremity ulcerations. No cyanosis.    Musculoskeletal:         Left Upper Extremity:  + NROM. Non-tender. No signs of trauma.        Right Upper Extremity:  + NROM. Non-tender. No signs of trauma.        Left Lower Extremity: +plantar/dorsiflexion/FHL intact, EHL NOT intact, Decreased ROM of RLE due to reported pelvic pain, compartments soft and compressible, 2+ DP pulse, SILT with mild tingling reported on the right foot dorsum.       Right Lower Extremity: +plantar/dorsiflexion/EHL/FHL intact, Decreased ROM of RLE due to reported pelvic pain, compartments soft and compressible, 2+ DP pulse, SILT.    Imaging Studies: All imaging reviewed by Dr. Godinez.    < from: CT Pelvis No Cont (03.25.19 @ 09:26) >  EXAM:  CT PELVIS ONLY                            PROCEDURE DATE:  03/25/2019          INTERPRETATION:  Exam Date: 3/25/2019 9:26 AM    CT bony pelvis         CLINICAL INFORMATION: Bony pelvis pain  R/O FRACTURE    TECHNIQUE: Contiguous axial CT sections of the pelvis were obtained.   Coronal and sagittal reformats were obtained.    FINDINGS:   No prior similar studies are available for review.    There are numerous acute fractures of the sacrum and pelvis as follows.     There is a comminuted sacral fracture involving the central portion of   the sacrum and involving he posterior elements of the sacrum along the   midline. There is suggestion of 2 small cortical fractures along the   posterior inferior endplate of L5. No bony retropulsion is present.    There are comminuted fractures of the bilateral anterior acetabuli. The   bilateral femoral heads remain located. No fractures of the visualized   portions of the femurs.    There is a distracted fracture of the left medial superior pubic ramus   adjacent to the symphysis pubis. There is a distracted fracture of the   left lateral inferior pubic ramus. There is a distracted fracture of the   right lateral inferior pubic ramus.    Bladder wall thickening is suggested. No free fluid orfree air within   the lower abdomen and pelvis. Visualized portions of the bowel appear   unremarkable.    There is a fluid collection within the deep soft tissues of the left hip,   lateral to the musculature, measuring approximately 1.7 cm in widthx 14   cm in cc dimension.    There is a small amount of posttraumatic inflammatory changes within the   subcutaneous tissues of the right lateral hip.    Impression:    Numerous acute comminuted fractures of the sacrum and pelvis, involving   the sacrum, inferior endplate of L5, bilateral acetabuli, bilateral   inferior pubic rami, and left superior pubic ramus, as detailed above.   Soft tissue changes as above.    Patient was sent to the ED for treatment.      Critical value:  I discussed the finding of this report with Dr. Jennings at 9:50 AM on March 25, 2019.  Critical value policy of the   hospital was followed.  Read back and confirmation of receipt of this   communication was performed.  This verbal communication supplements the  text report of this document.                                  SANDY TERRELL M.D., ATTENDING RADIOLOGIST  This document has been electronically signed. Mar 25 2019  9:54AM        < end of copied text >      A/P:  Pt is a  43y Male transfer from [facility] with multiple pelvic fractures as described in the CT scan report above x 1 month ago.    PLAN:   * NPO after midnight for OR tomorrow  * Medical clearance requested   * Pre op antibiotics ordered  * IV fluids once patient is npo  * Pelvis xrays ordered  * Pain control as clinically indicated  * Continue care as per primary team  * Further plan pending review of all imaging by Dr. Godinez Pt Name: MELINA DEE    MRN: [id number]      Patient is a 43y Male presenting to the emergency department with a chief complaint of b/l pelvic pain x 1 month. Pt states that 1 month ago he was at work when he was pinned between a truck tire and railing on both sides of his body. Pt has been ambulating with crutches since the injury with intermittent pain reported. Pt was seen at the [place] ED today where a CT showed multiple pelvic fractures/ L5 TP fxs and was transferred to [facility] ED for further evaluation. Pt otherwise reported mild tingling sensation of his right foot but otherwise denies fever/chills, c/p, sob, abdominal pain, n/v, numbness and has no other complaints.  .      REVIEW OF SYSTEMS    General: Alert, responsive, in NAD    Skin/Breast: No rashes, no pruritis   	  Ophthalmologic: No visual changes. No redness.   	  ENMT:	No discharge. No swelling.    Respiratory and Thorax: No difficulty breathing. No cough.  	   Cardiovascular:	No chest pain. No palpitations.    Gastrointestinal:	 No abdominal pain. No diarrhea.     Genitourinary: No dysuria. No bleeding.    Musculoskeletal: SEE HPI.    Neurological: No sensory or motor changes.     Psychiatric: No anxiety or depression.    Hematology/Lymphatics: No swelling.    Endocrine: No Hx of diabetes.    ROS is otherwise negative.    PAST MEDICAL & SURGICAL HISTORY:  PAST MEDICAL & SURGICAL HISTORY:  No pertinent past medical history  No significant past surgical history      Allergies: No Known Allergies      Medications: acetaminophen   Tablet .. 650 milliGRAM(s) Oral every 6 hours PRN  enoxaparin Injectable 30 milliGRAM(s) SubCutaneous every 12 hours  lactated ringers. 1000 milliLiter(s) IV Continuous <Continuous>  nicotine -  14 mG/24Hr(s) Patch 1 patch Transdermal daily  oxyCODONE    IR 5 milliGRAM(s) Oral every 4 hours PRN      FAMILY HISTORY:  : non-contributory    Social History: Denies ETOH abuse.    Ambulation: Walking independently [ x ] With Cane [ ] With Walker [ ]  Bedbound [ ]                           13.5   8.03  )-----------( 199      ( 25 Mar 2019 13:16 )             39.7       03-25    142  |  109<H>  |  16  ----------------------------<  93  4.3   |  30  |  1.05    Ca    8.4<L>      25 Mar 2019 13:16    TPro  7.1  /  Alb  3.8  /  TBili  0.5  /  DBili  x   /  AST  20  /  ALT  27  /  AlkPhos  154<H>  03-25      Vital Signs Last 24 Hrs  T(C): 36.7 (25 Mar 2019 19:31), Max: 37.1 (25 Mar 2019 18:40)  T(F): 98 (25 Mar 2019 19:31), Max: 98.8 (25 Mar 2019 18:40)  HR: 66 (25 Mar 2019 19:31) (56 - 68)  BP: 137/84 (25 Mar 2019 19:31) (137/84 - 160/95)  BP(mean): --  RR: 20 (25 Mar 2019 19:31) (17 - 20)  SpO2: 97% (25 Mar 2019 19:31) (97% - 99%)    Daily Height in cm: 175.26 (25 Mar 2019 19:31)    Daily       PHYSICAL EXAM:      Appearance: Alert, responsive, in no acute distress.    Neurological: Sensation is grossly intact to light touch. +tingling sensation reported on the right dorsal foot.     Skin: no rash on visible skin. Skin is clean, dry and intact. No bleeding. No abrasions. No ulcerations.    Vascular: 2+ distal radial/DP/PT pulses. Cap refill < 2 sec. No signs of venous insufficiency or stasis. No extremity ulcerations. No cyanosis.    Musculoskeletal:         Left Upper Extremity:  + NROM. Non-tender. No signs of trauma.        Right Upper Extremity:  + NROM. Non-tender. No signs of trauma.        Left Lower Extremity: +plantar/dorsiflexion/FHL intact, EHL NOT intact, Decreased ROM of RLE due to reported pelvic pain, compartments soft and compressible, 2+ DP pulse, SILT with mild tingling reported on the right foot dorsum.       Right Lower Extremity: +plantar/dorsiflexion/EHL/FHL intact, Decreased ROM of RLE due to reported pelvic pain, compartments soft and compressible, 2+ DP pulse, SILT.    Imaging Studies: All imaging reviewed by Dr. Godinez.    < from: CT Pelvis No Cont (03.25.19 @ 09:26) >  EXAM:  CT PELVIS ONLY                            PROCEDURE DATE:  03/25/2019          INTERPRETATION:  Exam Date: 3/25/2019 9:26 AM    CT bony pelvis         CLINICAL INFORMATION: Bony pelvis pain  R/O FRACTURE    TECHNIQUE: Contiguous axial CT sections of the pelvis were obtained.   Coronal and sagittal reformats were obtained.    FINDINGS:   No prior similar studies are available for review.    There are numerous acute fractures of the sacrum and pelvis as follows.     There is a comminuted sacral fracture involving the central portion of   the sacrum and involving he posterior elements of the sacrum along the   midline. There is suggestion of 2 small cortical fractures along the   posterior inferior endplate of L5. No bony retropulsion is present.    There are comminuted fractures of the bilateral anterior acetabuli. The   bilateral femoral heads remain located. No fractures of the visualized   portions of the femurs.    There is a distracted fracture of the left medial superior pubic ramus   adjacent to the symphysis pubis. There is a distracted fracture of the   left lateral inferior pubic ramus. There is a distracted fracture of the   right lateral inferior pubic ramus.    Bladder wall thickening is suggested. No free fluid orfree air within   the lower abdomen and pelvis. Visualized portions of the bowel appear   unremarkable.    There is a fluid collection within the deep soft tissues of the left hip,   lateral to the musculature, measuring approximately 1.7 cm in widthx 14   cm in cc dimension.    There is a small amount of posttraumatic inflammatory changes within the   subcutaneous tissues of the right lateral hip.    Impression:    Numerous acute comminuted fractures of the sacrum and pelvis, involving   the sacrum, inferior endplate of L5, bilateral acetabuli, bilateral   inferior pubic rami, and left superior pubic ramus, as detailed above.   Soft tissue changes as above.    Patient was sent to the ED for treatment.      Critical value:  I discussed the finding of this report with Dr. Jennings at 9:50 AM on March 25, 2019.  Critical value policy of the   hospital was followed.  Read back and confirmation of receipt of this   communication was performed.  This verbal communication supplements the  text report of this document.                                  SANDY TERRELL M.D., ATTENDING RADIOLOGIST  This document has been electronically signed. Mar 25 2019  9:54AM        < end of copied text >      A/P:  Pt is a  43y Male transfer from [place] ED with multiple pelvic fractures & L5 TP fx as described in the CT scan report above x 1 month ago.    PLAN:   * NPO after midnight for OR tomorrow  * Medical clearance requested   * Pre op antibiotics ordered  * IV fluids once patient is npo  * Pelvis xrays ordered  * Pain control as clinically indicated  * Continue care as per primary team  * Further plan pending review of all imaging by Dr. Godinez Pt Name: MELINA DEE    MRN: [id number]      Patient is a 43y Male presenting to the emergency department with a chief complaint of b/l pelvic pain x 1 month. Pt states that 1 month ago he was at work when he was pinned between a truck tire and railing on both sides of his body. Pt has been ambulating with crutches since the injury with intermittent pain reported. Pt was seen at the [place] ED today where a CT showed multiple pelvic fractures/ L5 TP fxs and was transferred to [facility] ED for further evaluation. Pt otherwise reported mild tingling sensation of his right foot but otherwise denies fever/chills, c/p, sob, abdominal pain, n/v, numbness and has no other complaints.  .      REVIEW OF SYSTEMS    General: Alert, responsive, in NAD    Skin/Breast: No rashes, no pruritis   	  Ophthalmologic: No visual changes. No redness.   	  ENMT:	No discharge. No swelling.    Respiratory and Thorax: No difficulty breathing. No cough.  	   Cardiovascular:	No chest pain. No palpitations.    Gastrointestinal:	 No abdominal pain. No diarrhea.     Genitourinary: No dysuria. No bleeding.    Musculoskeletal: SEE HPI.    Neurological: No sensory or motor changes.     Psychiatric: No anxiety or depression.    Hematology/Lymphatics: No swelling.    Endocrine: No Hx of diabetes.    ROS is otherwise negative.    PAST MEDICAL & SURGICAL HISTORY:  PAST MEDICAL & SURGICAL HISTORY:  No pertinent past medical history  No significant past surgical history      Allergies: No Known Allergies      Medications: acetaminophen   Tablet .. 650 milliGRAM(s) Oral every 6 hours PRN  enoxaparin Injectable 30 milliGRAM(s) SubCutaneous every 12 hours  lactated ringers. 1000 milliLiter(s) IV Continuous <Continuous>  nicotine -  14 mG/24Hr(s) Patch 1 patch Transdermal daily  oxyCODONE    IR 5 milliGRAM(s) Oral every 4 hours PRN      FAMILY HISTORY:  : non-contributory    Social History: Denies ETOH abuse.    Ambulation: Walking independently [ x ] With Cane [ ] With Walker [ ]  Bedbound [ ]                           13.5   8.03  )-----------( 199      ( 25 Mar 2019 13:16 )             39.7       03-25    142  |  109<H>  |  16  ----------------------------<  93  4.3   |  30  |  1.05    Ca    8.4<L>      25 Mar 2019 13:16    TPro  7.1  /  Alb  3.8  /  TBili  0.5  /  DBili  x   /  AST  20  /  ALT  27  /  AlkPhos  154<H>  03-25      Vital Signs Last 24 Hrs  T(C): 36.7 (25 Mar 2019 19:31), Max: 37.1 (25 Mar 2019 18:40)  T(F): 98 (25 Mar 2019 19:31), Max: 98.8 (25 Mar 2019 18:40)  HR: 66 (25 Mar 2019 19:31) (56 - 68)  BP: 137/84 (25 Mar 2019 19:31) (137/84 - 160/95)  BP(mean): --  RR: 20 (25 Mar 2019 19:31) (17 - 20)  SpO2: 97% (25 Mar 2019 19:31) (97% - 99%)    Daily Height in cm: 175.26 (25 Mar 2019 19:31)    Daily       PHYSICAL EXAM:      Appearance: Alert, responsive, in no acute distress.    Neurological: Sensation is grossly intact to light touch. +tingling sensation reported on the right dorsal foot.     Skin: no rash on visible skin. Skin is clean, dry and intact. No bleeding. No abrasions. No ulcerations.    Vascular: 2+ distal radial/DP/PT pulses. Cap refill < 2 sec. No signs of venous insufficiency or stasis. No extremity ulcerations. No cyanosis.    Musculoskeletal:         Left Upper Extremity:  + NROM. Non-tender. No signs of trauma.        Right Upper Extremity:  + NROM. Non-tender. No signs of trauma.        Left Lower Extremity: +plantar/dorsiflexion/EHL/FHL intact, Decreased ROM of RLE due to reported pelvic pain, compartments soft and compressible, 2+ DP pulse, SILT.       Right Lower Extremity: +plantar/dorsiflexion/FHL intact, EHL NOT intact, Decreased ROM of RLE due to reported pelvic pain, compartments soft and compressible, 2+ DP pulse, SILT with mild tingling reported on the right foot dorsum.      Imaging Studies: All imaging reviewed by Dr. Godinez.    < from: CT Pelvis No Cont (03.25.19 @ 09:26) >  EXAM:  CT PELVIS ONLY                            PROCEDURE DATE:  03/25/2019          INTERPRETATION:  Exam Date: 3/25/2019 9:26 AM    CT bony pelvis         CLINICAL INFORMATION: Bony pelvis pain  R/O FRACTURE    TECHNIQUE: Contiguous axial CT sections of the pelvis were obtained.   Coronal and sagittal reformats were obtained.    FINDINGS:   No prior similar studies are available for review.    There are numerous acute fractures of the sacrum and pelvis as follows.     There is a comminuted sacral fracture involving the central portion of   the sacrum and involving he posterior elements of the sacrum along the   midline. There is suggestion of 2 small cortical fractures along the   posterior inferior endplate of L5. No bony retropulsion is present.    There are comminuted fractures of the bilateral anterior acetabuli. The   bilateral femoral heads remain located. No fractures of the visualized   portions of the femurs.    There is a distracted fracture of the left medial superior pubic ramus   adjacent to the symphysis pubis. There is a distracted fracture of the   left lateral inferior pubic ramus. There is a distracted fracture of the   right lateral inferior pubic ramus.    Bladder wall thickening is suggested. No free fluid orfree air within   the lower abdomen and pelvis. Visualized portions of the bowel appear   unremarkable.    There is a fluid collection within the deep soft tissues of the left hip,   lateral to the musculature, measuring approximately 1.7 cm in widthx 14   cm in cc dimension.    There is a small amount of posttraumatic inflammatory changes within the   subcutaneous tissues of the right lateral hip.    Impression:    Numerous acute comminuted fractures of the sacrum and pelvis, involving   the sacrum, inferior endplate of L5, bilateral acetabuli, bilateral   inferior pubic rami, and left superior pubic ramus, as detailed above.   Soft tissue changes as above.    Patient was sent to the ED for treatment.      Critical value:  I discussed the finding of this report with Dr. Jennings at 9:50 AM on March 25, 2019.  Critical value policy of the   hospital was followed.  Read back and confirmation of receipt of this   communication was performed.  This verbal communication supplements the  text report of this document.                                  SANDY TERRELL M.D., ATTENDING RADIOLOGIST  This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

## 2019-03-26 ENCOUNTER — TRANSCRIPTION ENCOUNTER (OUTPATIENT)
Age: 44
End: 2019-03-26

## 2019-03-26 LAB
ANION GAP SERPL CALC-SCNC: 10 MMOL/L — SIGNIFICANT CHANGE UP (ref 5–17)
BLD GP AB SCN SERPL QL: SIGNIFICANT CHANGE UP
BLD GP AB SCN SERPL QL: SIGNIFICANT CHANGE UP
BUN SERPL-MCNC: 14 MG/DL — SIGNIFICANT CHANGE UP (ref 8–20)
CALCIUM SERPL-MCNC: 8.5 MG/DL — LOW (ref 8.6–10.2)
CHLORIDE SERPL-SCNC: 101 MMOL/L — SIGNIFICANT CHANGE UP (ref 98–107)
CO2 SERPL-SCNC: 26 MMOL/L — SIGNIFICANT CHANGE UP (ref 22–29)
CREAT SERPL-MCNC: 0.87 MG/DL — SIGNIFICANT CHANGE UP (ref 0.5–1.3)
GLUCOSE SERPL-MCNC: 97 MG/DL — SIGNIFICANT CHANGE UP (ref 70–115)
HCT VFR BLD CALC: 37.4 % — LOW (ref 42–52)
HGB BLD-MCNC: 12.8 G/DL — LOW (ref 14–18)
MAGNESIUM SERPL-MCNC: 1.9 MG/DL — SIGNIFICANT CHANGE UP (ref 1.6–2.6)
MCHC RBC-ENTMCNC: 31.5 PG — HIGH (ref 27–31)
MCHC RBC-ENTMCNC: 34.2 G/DL — SIGNIFICANT CHANGE UP (ref 32–36)
MCV RBC AUTO: 92.1 FL — SIGNIFICANT CHANGE UP (ref 80–94)
PHOSPHATE SERPL-MCNC: 3.3 MG/DL — SIGNIFICANT CHANGE UP (ref 2.4–4.7)
PLATELET # BLD AUTO: 190 K/UL — SIGNIFICANT CHANGE UP (ref 150–400)
POTASSIUM SERPL-MCNC: 3.9 MMOL/L — SIGNIFICANT CHANGE UP (ref 3.5–5.3)
POTASSIUM SERPL-SCNC: 3.9 MMOL/L — SIGNIFICANT CHANGE UP (ref 3.5–5.3)
RBC # BLD: 4.06 M/UL — LOW (ref 4.6–6.2)
RBC # FLD: 14.9 % — SIGNIFICANT CHANGE UP (ref 11–15.6)
SODIUM SERPL-SCNC: 137 MMOL/L — SIGNIFICANT CHANGE UP (ref 135–145)
WBC # BLD: 7.9 K/UL — SIGNIFICANT CHANGE UP (ref 4.8–10.8)
WBC # FLD AUTO: 7.9 K/UL — SIGNIFICANT CHANGE UP (ref 4.8–10.8)

## 2019-03-26 PROCEDURE — 99231 SBSQ HOSP IP/OBS SF/LOW 25: CPT | Mod: 57

## 2019-03-26 PROCEDURE — 22310 CLOSED TX VERT FX W/O MANJ: CPT

## 2019-03-26 PROCEDURE — 72170 X-RAY EXAM OF PELVIS: CPT | Mod: 26

## 2019-03-26 PROCEDURE — 76000 FLUOROSCOPY <1 HR PHYS/QHP: CPT | Mod: 26

## 2019-03-26 PROCEDURE — 27216 TREAT PELVIC RING FRACTURE: CPT | Mod: 50

## 2019-03-26 PROCEDURE — 27220 TREAT HIP SOCKET FRACTURE: CPT | Mod: RT

## 2019-03-26 PROCEDURE — 99231 SBSQ HOSP IP/OBS SF/LOW 25: CPT

## 2019-03-26 RX ORDER — FENTANYL CITRATE 50 UG/ML
50 INJECTION INTRAVENOUS
Qty: 0 | Refills: 0 | Status: DISCONTINUED | OUTPATIENT
Start: 2019-03-26 | End: 2019-03-26

## 2019-03-26 RX ORDER — ONDANSETRON 8 MG/1
4 TABLET, FILM COATED ORAL ONCE
Qty: 0 | Refills: 0 | Status: DISCONTINUED | OUTPATIENT
Start: 2019-03-26 | End: 2019-03-26

## 2019-03-26 RX ORDER — INFLUENZA VIRUS VACCINE 15; 15; 15; 15 UG/.5ML; UG/.5ML; UG/.5ML; UG/.5ML
0.5 SUSPENSION INTRAMUSCULAR ONCE
Qty: 0 | Refills: 0 | Status: COMPLETED | OUTPATIENT
Start: 2019-03-26 | End: 2019-03-26

## 2019-03-26 RX ORDER — OXYCODONE HYDROCHLORIDE 5 MG/1
10 TABLET ORAL EVERY 4 HOURS
Qty: 0 | Refills: 0 | Status: DISCONTINUED | OUTPATIENT
Start: 2019-03-26 | End: 2019-03-27

## 2019-03-26 RX ORDER — ENOXAPARIN SODIUM 100 MG/ML
40 INJECTION SUBCUTANEOUS
Qty: 1120 | Refills: 0
Start: 2019-03-26 | End: 2019-04-22

## 2019-03-26 RX ORDER — ACETAMINOPHEN 500 MG
2 TABLET ORAL
Qty: 0 | Refills: 0 | DISCHARGE
Start: 2019-03-26

## 2019-03-26 RX ORDER — ENOXAPARIN SODIUM 100 MG/ML
40 INJECTION SUBCUTANEOUS EVERY 24 HOURS
Qty: 0 | Refills: 0 | Status: DISCONTINUED | OUTPATIENT
Start: 2019-03-26 | End: 2019-03-27

## 2019-03-26 RX ORDER — OXYCODONE HYDROCHLORIDE 5 MG/1
5 TABLET ORAL EVERY 4 HOURS
Qty: 0 | Refills: 0 | Status: DISCONTINUED | OUTPATIENT
Start: 2019-03-26 | End: 2019-03-27

## 2019-03-26 RX ORDER — SODIUM CHLORIDE 9 MG/ML
1000 INJECTION, SOLUTION INTRAVENOUS
Qty: 0 | Refills: 0 | Status: DISCONTINUED | OUTPATIENT
Start: 2019-03-26 | End: 2019-03-26

## 2019-03-26 RX ORDER — ACETAMINOPHEN 500 MG
650 TABLET ORAL EVERY 6 HOURS
Qty: 0 | Refills: 0 | Status: DISCONTINUED | OUTPATIENT
Start: 2019-03-26 | End: 2019-03-27

## 2019-03-26 RX ORDER — ONDANSETRON 8 MG/1
4 TABLET, FILM COATED ORAL EVERY 6 HOURS
Qty: 0 | Refills: 0 | Status: DISCONTINUED | OUTPATIENT
Start: 2019-03-26 | End: 2019-03-27

## 2019-03-26 RX ORDER — MAGNESIUM HYDROXIDE 400 MG/1
30 TABLET, CHEWABLE ORAL DAILY
Qty: 0 | Refills: 0 | Status: DISCONTINUED | OUTPATIENT
Start: 2019-03-26 | End: 2019-03-27

## 2019-03-26 RX ORDER — DIPHENHYDRAMINE HCL 50 MG
25 CAPSULE ORAL EVERY 4 HOURS
Qty: 0 | Refills: 0 | Status: DISCONTINUED | OUTPATIENT
Start: 2019-03-26 | End: 2019-03-27

## 2019-03-26 RX ORDER — HYDROMORPHONE HYDROCHLORIDE 2 MG/ML
0.5 INJECTION INTRAMUSCULAR; INTRAVENOUS; SUBCUTANEOUS
Qty: 0 | Refills: 0 | Status: DISCONTINUED | OUTPATIENT
Start: 2019-03-26 | End: 2019-03-27

## 2019-03-26 RX ORDER — DOCUSATE SODIUM 100 MG
100 CAPSULE ORAL THREE TIMES A DAY
Qty: 0 | Refills: 0 | Status: DISCONTINUED | OUTPATIENT
Start: 2019-03-26 | End: 2019-03-27

## 2019-03-26 RX ORDER — HYDROMORPHONE HYDROCHLORIDE 2 MG/ML
0.5 INJECTION INTRAMUSCULAR; INTRAVENOUS; SUBCUTANEOUS
Qty: 0 | Refills: 0 | Status: DISCONTINUED | OUTPATIENT
Start: 2019-03-26 | End: 2019-03-26

## 2019-03-26 RX ORDER — CEFAZOLIN SODIUM 1 G
2000 VIAL (EA) INJECTION EVERY 8 HOURS
Qty: 0 | Refills: 0 | Status: COMPLETED | OUTPATIENT
Start: 2019-03-26 | End: 2019-03-27

## 2019-03-26 RX ORDER — DOCUSATE SODIUM 100 MG
1 CAPSULE ORAL
Qty: 0 | Refills: 0 | DISCHARGE
Start: 2019-03-26

## 2019-03-26 RX ADMIN — ENOXAPARIN SODIUM 40 MILLIGRAM(S): 100 INJECTION SUBCUTANEOUS at 17:15

## 2019-03-26 RX ADMIN — Medication 100 MILLIGRAM(S): at 18:41

## 2019-03-26 RX ADMIN — Medication 1 TABLET(S): at 17:15

## 2019-03-26 RX ADMIN — Medication 100 MILLIGRAM(S): at 17:15

## 2019-03-26 RX ADMIN — SODIUM CHLORIDE 125 MILLILITER(S): 9 INJECTION, SOLUTION INTRAVENOUS at 07:45

## 2019-03-26 RX ADMIN — OXYCODONE HYDROCHLORIDE 10 MILLIGRAM(S): 5 TABLET ORAL at 17:42

## 2019-03-26 RX ADMIN — OXYCODONE HYDROCHLORIDE 10 MILLIGRAM(S): 5 TABLET ORAL at 23:51

## 2019-03-26 RX ADMIN — OXYCODONE HYDROCHLORIDE 10 MILLIGRAM(S): 5 TABLET ORAL at 17:16

## 2019-03-26 RX ADMIN — SODIUM CHLORIDE 125 MILLILITER(S): 9 INJECTION, SOLUTION INTRAVENOUS at 00:39

## 2019-03-26 RX ADMIN — FENTANYL CITRATE 50 MICROGRAM(S): 50 INJECTION INTRAVENOUS at 13:00

## 2019-03-26 RX ADMIN — SODIUM CHLORIDE 125 MILLILITER(S): 9 INJECTION, SOLUTION INTRAVENOUS at 17:30

## 2019-03-26 RX ADMIN — OXYCODONE HYDROCHLORIDE 5 MILLIGRAM(S): 5 TABLET ORAL at 00:00

## 2019-03-26 NOTE — DISCHARGE NOTE PROVIDER - CARE PROVIDERS DIRECT ADDRESSES
[email or web address]

## 2019-03-26 NOTE — BRIEF OPERATIVE NOTE - COMMENTS
WBAT  SCDs  abx per scip protocol  OOB  incentive spirometry  ice  follow up with dr ku in office in 2 weeks, call office for appointment  remove staples in 2-3 weeks  dressing changes prn only if saturated  ortho stable for DC pending medical/PT recs

## 2019-03-26 NOTE — DISCHARGE NOTE PROVIDER - CARE PROVIDER_API CALL
Howie Godinez (MD)  Orthopaedic Surgery  [address] [place] [address]  Phone: [phone]  Fax: [phone]  Follow Up Time:     Enmanuel Adkins ()  Orthopaedic Surgery  [address]  [place] [address]  Phone: [phone]  Fax: [phone]  Follow Up Time:

## 2019-03-26 NOTE — PROGRESS NOTE ADULT - SUBJECTIVE AND OBJECTIVE BOX
MELINA DEE    [id number]    History:  The patient is currently being treated non-operatively for a lumbar L5 transverse process fracture. Patient is comfortable in bed. He continues to have right great toe extension weakness and sensory weakness of the dorsal aspect only of the right toes 1-3. He did refuse the MRI yesterday but reports that he is agreeable today to MRI. He is scheduled for pelvis open reduction internal fixation today.  Denies nausea, vomiting, chest pain, shortness of breath, abdominal pain or fever. No new complaints. No acute motor or sensory changes are reported.         Vital Signs Last 24 Hrs  T(C): 37.1 (26 Mar 2019 09:27), Max: 37.2 (26 Mar 2019 08:41)  T(F): 98.8 (26 Mar 2019 09:27), Max: 98.9 (26 Mar 2019 08:41)  HR: 51 (26 Mar 2019 09:27) (51 - 68)  BP: 148/90 (26 Mar 2019 09:27) (126/62 - 160/95)  BP(mean): --  RR: 16 (26 Mar 2019 09:27) (16 - 20)  SpO2: 99% (26 Mar 2019 09:27) (97% - 100%)                          12.8   7.9   )-----------( 190      ( 26 Mar 2019 02:29 )             37.4     03-26    137  |  101  |  14.0  ----------------------------<  97  3.9   |  26.0  |  0.87    Ca    8.5<L>      26 Mar 2019 02:29  Phos  3.3     03-26  Mg     1.9     03-26    TPro  7.1  /  Alb  3.8  /  TBili  0.5  /  DBili  x   /  AST  20  /  ALT  27  /  AlkPhos  154<H>  03-25      MEDICATIONS  (STANDING):  ceFAZolin   IVPB 2000 milliGRAM(s) IV Intermittent once  influenza   Vaccine 0.5 milliLiter(s) IntraMuscular once  lactated ringers. 1000 milliLiter(s) (125 mL/Hr) IV Continuous <Continuous>  nicotine -  14 mG/24Hr(s) Patch 1 patch Transdermal daily    MEDICATIONS  (PRN):  acetaminophen   Tablet .. 650 milliGRAM(s) Oral every 6 hours PRN Mild Pain (1 - 3)  oxyCODONE    IR 5 milliGRAM(s) Oral every 4 hours PRN Moderate Pain (4 - 6)      Physical exam: Sensation to light touch of the lower extremities reveals decreased sensation of the dorsal aspect of right toes 1-3. Normal sensation of the plantar aspects.  No tremor or clonus. Babinski test is negative. RLE:  Motor function is 5/5 without focal deficit. LLE: demonstrates isolated EHL absent motion with extension. 4/5 Great toe flexion. 4/5 ankle flexion and extension.  No calf tenderness. There is good passive range of motion of the hips and knees without noted joint pain provocation. + pelvis pain and hesitation with attempted flexion against light resistance. 2+ dorsalis pedis pulse. Capillary refill is less than 2 seconds. No cyanosis.    Primary Orthopedic Assessment:  • Lumbar transverse process fracture    Secondary  Orthopedic Assessment(s):   •     Secondary  Medical Assessment(s):   • Right foot EHL weakness    Plan:   • Will order a LSO brace to be used when out of bed and ambulating  • Will obtain MRI tomorrow after pelvis open reduction internal fixation today

## 2019-03-26 NOTE — DISCHARGE NOTE PROVIDER - NSDCFUADDINST_GEN_ALL_CORE_FT
Weight bearing as tolerated.  Outpatient Physical Therapy  May take Ibuprofen 400 mg by mouth alternating with Tylenol 660 mg by mouth every 4-6 hours.  May use Percocet prescription for moderate pain. Weight bearing as tolerated.  Outpatient Physical Therapy  May take Ibuprofen 400 mg by mouth alternating with Tylenol 660 mg by mouth every 4-6 hours.  May use Percocet prescription for moderate pain.  Please make follow up appointment for 10-14 days.

## 2019-03-26 NOTE — PROGRESS NOTE ADULT - SUBJECTIVE AND OBJECTIVE BOX
INTERVAL HPI/OVERNIGHT EVENTS: none   SUBJECTIVE: doing well this morning co mild back pain , went to OR this morning with ortho.       MEDICATIONS  (STANDING):  ceFAZolin   IVPB 2000 milliGRAM(s) IV Intermittent every 8 hours  ceFAZolin   IVPB 2000 milliGRAM(s) IV Intermittent once  docusate sodium 100 milliGRAM(s) Oral three times a day  enoxaparin Injectable 40 milliGRAM(s) SubCutaneous every 24 hours  influenza   Vaccine 0.5 milliLiter(s) IntraMuscular once  lactated ringers. 1000 milliLiter(s) (50 mL/Hr) IV Continuous <Continuous>  lactated ringers. 1000 milliLiter(s) (125 mL/Hr) IV Continuous <Continuous>  multivitamin 1 Tablet(s) Oral daily  nicotine -  14 mG/24Hr(s) Patch 1 patch Transdermal daily    MEDICATIONS  (PRN):  acetaminophen   Tablet .. 650 milliGRAM(s) Oral every 6 hours PRN Mild Pain (1 - 3)  acetaminophen   Tablet .. 650 milliGRAM(s) Oral every 6 hours PRN Temp greater or equal to 38C (100.4F), Mild Pain (1 - 3)  aluminum hydroxide/magnesium hydroxide/simethicone Suspension 30 milliLiter(s) Oral four times a day PRN Indigestion  diphenhydrAMINE 25 milliGRAM(s) Oral every 4 hours PRN Rash and/or Itching  fentaNYL    Injectable 50 MICROGram(s) IV Push every 10 minutes PRN Severe Pain (7 - 10)  HYDROmorphone  Injectable 0.5 milliGRAM(s) IV Push every 10 minutes PRN Moderate Pain (4 - 6)  HYDROmorphone  Injectable 0.5 milliGRAM(s) IV Push every 3 hours PRN breakthrough pain  magnesium hydroxide Suspension 30 milliLiter(s) Oral daily PRN Constipation  ondansetron Injectable 4 milliGRAM(s) IV Push once PRN Nausea and/or Vomiting  ondansetron Injectable 4 milliGRAM(s) IV Push every 6 hours PRN Nausea and/or Vomiting  oxyCODONE    IR 10 milliGRAM(s) Oral every 4 hours PRN Severe Pain (7 - 10)  oxyCODONE    IR 5 milliGRAM(s) Oral every 4 hours PRN Moderate Pain (4 - 6)  oxyCODONE    IR 5 milliGRAM(s) Oral every 4 hours PRN Moderate Pain (4 - 6)      Vital Signs Last 24 Hrs  T(C): 36.9 (26 Mar 2019 13:14), Max: 37.2 (26 Mar 2019 08:41)  T(F): 98.5 (26 Mar 2019 13:14), Max: 98.9 (26 Mar 2019 08:41)  HR: 57 (26 Mar 2019 13:29) (51 - 66)  BP: 135/76 (26 Mar 2019 13:29) (126/62 - 157/84)  BP(mean): --  RR: 12 (26 Mar 2019 13:29) (12 - 20)  SpO2: 95% (26 Mar 2019 13:29) (95% - 100%)    HEENT: Normocephalic, atraumatic, GLORIA, EOM wnl, no otorrhea or hemotympanum b/l, no epistaxis or d/c b/l nares, no craniofacial bony pathology or tenderness b/l  Neck: Pt in hard cervical collar at time of exam. No crepitus, no ecchymosis, no hematoma, to exam, no JVD, no tracheal deviation  Cspine/thoracolumbrosacral spine: no gross bony pathology or tenderness to exam  Cardiovascular: S1S2 Present  Chest: no gross rib pathology or tenderness to exam. No sternal pathology or tenderness to exam. No crepitus, no ecchymosis, no hematoma. No penetrating thorcoabdominal trauma  Respiratory: Rate is 18; Respiratory Effort normal; no wheezes, rales or rhonchi to exam  ABD: bowel sounds (+), soft, nontender, non distended, no rebound, no guarding, no rigidity, no skin changes to exam. No pelvic instability to exam, no skin changes  Musculoskeletal: edema noted to left hip area, tender to palpation. unable to perform straight leg raise 2/2 to pain bilaterlly   Vascular: Palpable bilateral DP/PT pulses  Skin: no lesions or rashes to exam    I&O's Detail    26 Mar 2019 07:01  -  26 Mar 2019 13:49  --------------------------------------------------------  IN:    lactated ringers.: 250 mL  Total IN: 250 mL    OUT:  Total OUT: 0 mL    Total NET: 250 mL          LABS:                        12.8   7.9   )-----------( 190      ( 26 Mar 2019 02:29 )             37.4     03-26    137  |  101  |  14.0  ----------------------------<  97  3.9   |  26.0  |  0.87    Ca    8.5<L>      26 Mar 2019 02:29  Phos  3.3     03-26  Mg     1.9     03-26    TPro  7.1  /  Alb  3.8  /  TBili  0.5  /  DBili  x   /  AST  20  /  ALT  27  /  AlkPhos  154<H>  03-25    PT/INR - ( 25 Mar 2019 23:41 )   PT: 11.5 sec;   INR: 1.00 ratio         PTT - ( 25 Mar 2019 23:41 )  PTT:32.5 sec      RADIOLOGY & ADDITIONAL STUDIES:

## 2019-03-26 NOTE — PROGRESS NOTE ADULT - SUBJECTIVE AND OBJECTIVE BOX
Orthopedic PA Postop Note  Patient S/P PELVIS CRPP  Patient in bed comfortable     Dressing - Left hip dressing with mild staining    LEFT Leg  DP Pulse intact   Calf Soft NT  Dorsi/Plantar Flexion/EHL/FHL intact   Sensation intact to light touch    RIGHT Leg  DP Pulse intact   Calf Soft NT  Dorsi/Plantar Flexion/FHL intact, EHL not intact (present upon arrival to ED pre operatively last night)  Sensation intact to light touch- reports mild tingling sensation of the right foot dorsum (present upon arrival to ED pre operatively last night)    Vital Signs Last 24 Hrs  T(C): 36.5 (26 Mar 2019 16:00), Max: 37.2 (26 Mar 2019 08:41)  T(F): 97.7 (26 Mar 2019 16:00), Max: 98.9 (26 Mar 2019 08:41)  HR: 62 (26 Mar 2019 16:00) (51 - 65)  BP: 145/86 (26 Mar 2019 16:00) (126/62 - 157/84)  BP(mean): --  RR: 18 (26 Mar 2019 16:00) (12 - 18)  SpO2: 98% (26 Mar 2019 16:00) (94% - 100%)    < from: Xray Pelvis AP only (03.26.19 @ 14:33) >     EXAM:  PELVIS                          PROCEDURE DATE:  03/26/2019          INTERPRETATION:  X-RAY PELVIS:    History: Postoperative.    Date and time of exam: 3/26/2019 2:27 PM.    Technique: A single AP view was obtained.    Findings: The sacrumhas been transfixed by several metallic screws since   the prior CT scan of 3/25/2019. Alignment appears to be anatomic..    Impression:  Postoperative changes.                GUNNER FINN M.D., ATTENDING RADIOLOGIST  This document has been electronically signed. Mar 26 2019  3:24PM        < end of copied text >      A/P: 43M S/P PELVIS CRPP  1. DVTP - LOVENOX  2. Physical Therapy   3. Pain Control as clinically indicated
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
## 2019-03-26 NOTE — DISCHARGE NOTE PROVIDER - NSDCCPTREATMENT_GEN_ALL_CORE_FT
PRINCIPAL PROCEDURE  Procedure: Pinning, fracture, pelvic ring, percutaneous  Findings and Treatment:
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
## 2019-03-27 ENCOUNTER — TRANSCRIPTION ENCOUNTER (OUTPATIENT)
Age: 44
End: 2019-03-27

## 2019-03-27 VITALS
HEART RATE: 72 BPM | DIASTOLIC BLOOD PRESSURE: 75 MMHG | SYSTOLIC BLOOD PRESSURE: 128 MMHG | TEMPERATURE: 98 F | OXYGEN SATURATION: 99 % | RESPIRATION RATE: 18 BRPM

## 2019-03-27 LAB
ANION GAP SERPL CALC-SCNC: 12 MMOL/L — SIGNIFICANT CHANGE UP (ref 5–17)
BASOPHILS # BLD AUTO: 0 K/UL — SIGNIFICANT CHANGE UP (ref 0–0.2)
BASOPHILS NFR BLD AUTO: 0.1 % — SIGNIFICANT CHANGE UP (ref 0–2)
BUN SERPL-MCNC: 10 MG/DL — SIGNIFICANT CHANGE UP (ref 8–20)
CALCIUM SERPL-MCNC: 8.5 MG/DL — LOW (ref 8.6–10.2)
CHLORIDE SERPL-SCNC: 100 MMOL/L — SIGNIFICANT CHANGE UP (ref 98–107)
CO2 SERPL-SCNC: 26 MMOL/L — SIGNIFICANT CHANGE UP (ref 22–29)
CREAT SERPL-MCNC: 0.87 MG/DL — SIGNIFICANT CHANGE UP (ref 0.5–1.3)
EOSINOPHIL # BLD AUTO: 0.1 K/UL — SIGNIFICANT CHANGE UP (ref 0–0.5)
EOSINOPHIL NFR BLD AUTO: 0.7 % — SIGNIFICANT CHANGE UP (ref 0–5)
GLUCOSE SERPL-MCNC: 89 MG/DL — SIGNIFICANT CHANGE UP (ref 70–115)
HCT VFR BLD CALC: 38.1 % — LOW (ref 42–52)
HGB BLD-MCNC: 12.9 G/DL — LOW (ref 14–18)
LYMPHOCYTES # BLD AUTO: 19.3 % — LOW (ref 20–55)
LYMPHOCYTES # BLD AUTO: 2.5 K/UL — SIGNIFICANT CHANGE UP (ref 1–4.8)
MAGNESIUM SERPL-MCNC: 1.8 MG/DL — SIGNIFICANT CHANGE UP (ref 1.6–2.6)
MCHC RBC-ENTMCNC: 31.3 PG — HIGH (ref 27–31)
MCHC RBC-ENTMCNC: 33.9 G/DL — SIGNIFICANT CHANGE UP (ref 32–36)
MCV RBC AUTO: 92.5 FL — SIGNIFICANT CHANGE UP (ref 80–94)
MONOCYTES # BLD AUTO: 1.4 K/UL — HIGH (ref 0–0.8)
MONOCYTES NFR BLD AUTO: 10.7 % — HIGH (ref 3–10)
NEUTROPHILS # BLD AUTO: 9 K/UL — HIGH (ref 1.8–8)
NEUTROPHILS NFR BLD AUTO: 68.9 % — SIGNIFICANT CHANGE UP (ref 37–73)
PHOSPHATE SERPL-MCNC: 3 MG/DL — SIGNIFICANT CHANGE UP (ref 2.4–4.7)
PLATELET # BLD AUTO: 185 K/UL — SIGNIFICANT CHANGE UP (ref 150–400)
POTASSIUM SERPL-MCNC: 4.1 MMOL/L — SIGNIFICANT CHANGE UP (ref 3.5–5.3)
POTASSIUM SERPL-SCNC: 4.1 MMOL/L — SIGNIFICANT CHANGE UP (ref 3.5–5.3)
RBC # BLD: 4.12 M/UL — LOW (ref 4.6–6.2)
RBC # FLD: 14.9 % — SIGNIFICANT CHANGE UP (ref 11–15.6)
SODIUM SERPL-SCNC: 138 MMOL/L — SIGNIFICANT CHANGE UP (ref 135–145)
WBC # BLD: 13 K/UL — HIGH (ref 4.8–10.8)
WBC # FLD AUTO: 13 K/UL — HIGH (ref 4.8–10.8)

## 2019-03-27 PROCEDURE — 27216 TREAT PELVIC RING FRACTURE: CPT | Mod: 50

## 2019-03-27 PROCEDURE — 27220 TREAT HIP SOCKET FRACTURE: CPT | Mod: LT

## 2019-03-27 PROCEDURE — 72148 MRI LUMBAR SPINE W/O DYE: CPT | Mod: 26

## 2019-03-27 PROCEDURE — 76000 FLUOROSCOPY <1 HR PHYS/QHP: CPT | Mod: 26

## 2019-03-27 RX ORDER — MAGNESIUM SULFATE 500 MG/ML
2 VIAL (ML) INJECTION ONCE
Qty: 0 | Refills: 0 | Status: COMPLETED | OUTPATIENT
Start: 2019-03-27 | End: 2019-03-27

## 2019-03-27 RX ORDER — NICOTINE POLACRILEX 2 MG
1 GUM BUCCAL
Qty: 1 | Refills: 0
Start: 2019-03-27 | End: 2019-04-09

## 2019-03-27 RX ADMIN — Medication 50 GRAM(S): at 15:09

## 2019-03-27 RX ADMIN — OXYCODONE HYDROCHLORIDE 10 MILLIGRAM(S): 5 TABLET ORAL at 20:03

## 2019-03-27 RX ADMIN — Medication 1 PATCH: at 11:35

## 2019-03-27 RX ADMIN — Medication 100 MILLIGRAM(S): at 15:08

## 2019-03-27 RX ADMIN — OXYCODONE HYDROCHLORIDE 10 MILLIGRAM(S): 5 TABLET ORAL at 11:44

## 2019-03-27 RX ADMIN — Medication 100 MILLIGRAM(S): at 06:09

## 2019-03-27 RX ADMIN — OXYCODONE HYDROCHLORIDE 10 MILLIGRAM(S): 5 TABLET ORAL at 12:40

## 2019-03-27 RX ADMIN — Medication 1 TABLET(S): at 11:44

## 2019-03-27 RX ADMIN — Medication 100 MILLIGRAM(S): at 06:10

## 2019-03-27 RX ADMIN — ENOXAPARIN SODIUM 40 MILLIGRAM(S): 100 INJECTION SUBCUTANEOUS at 17:09

## 2019-03-27 RX ADMIN — Medication 100 MILLIGRAM(S): at 02:14

## 2019-03-27 NOTE — PHYSICAL THERAPY INITIAL EVALUATION ADULT - ACTIVE RANGE OF MOTION EXAMINATION, REHAB EVAL
bilateral lower extremity Active ROM was WNL (within normal limits)/except no active movement for right great toe extension(documented on admit)/ortiz. upper extremity Active ROM was WNL (within normal limits)

## 2019-03-27 NOTE — PROGRESS NOTE ADULT - SUBJECTIVE AND OBJECTIVE BOX
Subjective: Patient seen and examined this AM. He was resting comfortably in bed with no acute events overnight. Had flatus with no BM. Tolerating diet well and pain is well controlled. He said he is now accepting the MRi of lumbar spine and will be getting that done today.         MEDICATIONS  (STANDING):  ceFAZolin   IVPB 2000 milliGRAM(s) IV Intermittent once  docusate sodium 100 milliGRAM(s) Oral three times a day  enoxaparin Injectable 40 milliGRAM(s) SubCutaneous every 24 hours  influenza   Vaccine 0.5 milliLiter(s) IntraMuscular once  multivitamin 1 Tablet(s) Oral daily  nicotine -  14 mG/24Hr(s) Patch 1 patch Transdermal daily    MEDICATIONS  (PRN):  acetaminophen   Tablet .. 650 milliGRAM(s) Oral every 6 hours PRN Mild Pain (1 - 3)  acetaminophen   Tablet .. 650 milliGRAM(s) Oral every 6 hours PRN Temp greater or equal to 38C (100.4F), Mild Pain (1 - 3)  aluminum hydroxide/magnesium hydroxide/simethicone Suspension 30 milliLiter(s) Oral four times a day PRN Indigestion  diphenhydrAMINE 25 milliGRAM(s) Oral every 4 hours PRN Rash and/or Itching  magnesium hydroxide Suspension 30 milliLiter(s) Oral daily PRN Constipation  ondansetron Injectable 4 milliGRAM(s) IV Push every 6 hours PRN Nausea and/or Vomiting  oxyCODONE    IR 10 milliGRAM(s) Oral every 4 hours PRN Severe Pain (7 - 10)  oxyCODONE    IR 5 milliGRAM(s) Oral every 4 hours PRN Moderate Pain (4 - 6)  oxyCODONE    IR 5 milliGRAM(s) Oral every 4 hours PRN Moderate Pain (4 - 6)      Vital Signs Last 24 Hrs  T(C): 36.7 (27 Mar 2019 07:28), Max: 36.9 (26 Mar 2019 13:14)  T(F): 98 (27 Mar 2019 07:28), Max: 98.5 (26 Mar 2019 13:14)  HR: 65 (27 Mar 2019 07:28) (57 - 65)  BP: 134/85 (27 Mar 2019 07:28) (124/77 - 157/84)  BP(mean): --  RR: 19 (27 Mar 2019 07:28) (12 - 19)  SpO2: 100% (27 Mar 2019 07:28) (94% - 100%)      03-26 - 03-27  --------------------------------------------------------  IN:    lactated ringers.: 687.5 mL    Solution: 100 mL  Total IN: 787.5 mL    OUT:    Voided: 1500 mL  Total OUT: 1500 mL    Total NET: -712.5 mL          Physical Exam:    Constitutional: NAD  HEENT: PERRL, EOMI  Neck: No JVD, FROM without pain  Respiratory: Respirations non-labored, no accessory muscle use  Cardiovascular: Regular rate & rhythm, S1, S2  Gastrointestinal: Soft, non-tender, non-distended  Extremities: No peripheral edema, No cyanosis  Neurological: A&O x 3; without gross deficit  Musculoskeletal: Edema noted to left hip area, tender to palpation. unable to perform straight leg raise 2/2 to pain bilaterally       LABS:                        12.9   13.0  )-----------( 185      ( 27 Mar 2019 07:29 )             38.1     03-27    138  |  100  |  10.0  ----------------------------<  89  4.1   |  26.0  |  0.87    Ca    8.5<L>      27 Mar 2019 07:29  Phos  3.0     03-27  Mg     1.8     03-27    TPro  7.1  /  Alb  3.8  /  TBili  0.5  /  DBili  x   /  AST  20  /  ALT  27  /  AlkPhos  154<H>  03-25    PT/INR - ( 25 Mar 2019 23:41 )   PT: 11.5 sec;   INR: 1.00 ratio         PTT - ( 25 Mar 2019 23:41 )  PTT:32.5 sec
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
## 2019-03-27 NOTE — DISCHARGE NOTE NURSING/CASE MANAGEMENT/SOCIAL WORK - NSDCPEWEB_GEN_ALL_CORE
[facility] website --- [email or web address] website --- [email or web address]

## 2019-03-27 NOTE — PHYSICAL THERAPY INITIAL EVALUATION ADULT - NEUROVASCULAR ASSESSMENT RLE
states his 1st and 2nd digit "feel rough" to touch compared to left LE in same location/no discoloration/warm

## 2019-03-27 NOTE — PHYSICAL THERAPY INITIAL EVALUATION ADULT - PREDICTED DURATION OF THERAPY (DAYS/WKS), PT EVAL
pt independent with axillary crutches for all mobility. no skilled needs at this time. will not follow.

## 2019-03-27 NOTE — DISCHARGE NOTE NURSING/CASE MANAGEMENT/SOCIAL WORK - NSDCPEEMAIL_GEN_ALL_CORE
[facility] for [facility] email [email or web address]

## 2019-03-27 NOTE — DISCHARGE NOTE NURSING/CASE MANAGEMENT/SOCIAL WORK - NSDCDPATPORTLINK_GEN_ALL_CORE
You can access the [facility] Portal, offered by [facility], by registering with the following website: [email or web address]
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
## 2019-03-27 NOTE — PHYSICAL THERAPY INITIAL EVALUATION ADULT - LIGHT TOUCH SENSATION, RLE, REHAB EVAL
states he feels touch on 1st and 2nd toes, but it "feels rough" compared to same on left/within normal limits

## 2019-04-10 ENCOUNTER — APPOINTMENT (OUTPATIENT)
Age: 44
End: 2019-04-10
Payer: OTHER MISCELLANEOUS

## 2019-04-10 VITALS — HEIGHT: 71 IN | WEIGHT: 195 LBS | BODY MASS INDEX: 27.3 KG/M2

## 2019-04-10 DIAGNOSIS — Z78.9 OTHER SPECIFIED HEALTH STATUS: ICD-10-CM

## 2019-04-10 DIAGNOSIS — R10.2 PELVIC AND PERINEAL PAIN: ICD-10-CM

## 2019-04-10 PROCEDURE — 99024 POSTOP FOLLOW-UP VISIT: CPT

## 2019-04-10 PROCEDURE — 72190 X-RAY EXAM OF PELVIS: CPT
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
## 2019-04-24 PROCEDURE — 84100 ASSAY OF PHOSPHORUS: CPT

## 2019-04-24 PROCEDURE — 86900 BLOOD TYPING SEROLOGIC ABO: CPT

## 2019-04-24 PROCEDURE — 72170 X-RAY EXAM OF PELVIS: CPT

## 2019-04-24 PROCEDURE — 86850 RBC ANTIBODY SCREEN: CPT

## 2019-04-24 PROCEDURE — 71045 X-RAY EXAM CHEST 1 VIEW: CPT

## 2019-04-24 PROCEDURE — 99285 EMERGENCY DEPT VISIT HI MDM: CPT

## 2019-04-24 PROCEDURE — C1889: CPT

## 2019-04-24 PROCEDURE — 85610 PROTHROMBIN TIME: CPT

## 2019-04-24 PROCEDURE — 36415 COLL VENOUS BLD VENIPUNCTURE: CPT

## 2019-04-24 PROCEDURE — 83735 ASSAY OF MAGNESIUM: CPT

## 2019-04-24 PROCEDURE — 80048 BASIC METABOLIC PNL TOTAL CA: CPT

## 2019-04-24 PROCEDURE — 85730 THROMBOPLASTIN TIME PARTIAL: CPT

## 2019-04-24 PROCEDURE — 86901 BLOOD TYPING SEROLOGIC RH(D): CPT

## 2019-04-24 PROCEDURE — 85027 COMPLETE CBC AUTOMATED: CPT

## 2019-04-24 PROCEDURE — C1713: CPT

## 2019-04-24 PROCEDURE — 76000 FLUOROSCOPY <1 HR PHYS/QHP: CPT

## 2019-04-24 PROCEDURE — 72148 MRI LUMBAR SPINE W/O DYE: CPT

## 2019-04-24 PROCEDURE — 86923 COMPATIBILITY TEST ELECTRIC: CPT

## 2019-07-29 ENCOUNTER — APPOINTMENT (OUTPATIENT)
Dept: ORTHOPEDIC SURGERY | Facility: CLINIC | Age: 44
End: 2019-07-29
Payer: OTHER MISCELLANEOUS

## 2019-07-29 VITALS
WEIGHT: 190 LBS | SYSTOLIC BLOOD PRESSURE: 141 MMHG | BODY MASS INDEX: 26.6 KG/M2 | DIASTOLIC BLOOD PRESSURE: 93 MMHG | HEIGHT: 71 IN | TEMPERATURE: 99.1 F | HEART RATE: 59 BPM

## 2019-07-29 PROCEDURE — 72190 X-RAY EXAM OF PELVIS: CPT

## 2019-07-29 PROCEDURE — 99214 OFFICE O/P EST MOD 30 MIN: CPT

## 2019-07-29 NOTE — PHYSICAL EXAM
[de-identified] : Physical Exam:\par General: Well appearing, no acute distress, A&O\par Neurologic: A&Ox3, No focal deficits\par Head: NCAT without abrasions, lacerations, or ecchymosis to head, face, or scalp\par Respiratory: Equal chest wall expansion bilaterally, no accessory muscle use\par Lymphatic: No lymphadenopathy palpated\par Skin: Warm and dry\par Psychiatric: Normal mood and affect\par \par Lower Extremities:\par RIGHT LE: No deformities, abrasions, or other signs of trauma at hip, thigh, knee, leg, ankle or foot.  Full ROM without pain at hip, knee, ankle and toe with negative log-roll and Stinchfield tests.\par \par RLE strength: 		Hip flexion		5/5\par 			Quads			5/5\par 			Hamstrings		5/5\par 			Tib Anterior		5/5\par 			Gastroc		5/5\par 			EHL			0/5\par 			FHL			5/5\par RLE sensation intact to sural/saphenous/sup peroneal/deep peroneal/post tib distributions\par 2+ DP/PT pulses with good cap refill distally\par \par LEFT LE: No deformities, abrasions, or other signs of trauma at hip, thigh, knee, leg, ankle or foot.  Full ROM without pain at hip, knee, ankle and toe with negative log-roll and Stinchfield tests.\par \par LLE strength: 		Hip flexion		5/5\par 			Quads			5/5\par 			Hamstrings		5/5\par 			Tib Anterior		5/5\par 			Gastroc		5/5\par 			EHL			5/5\par 			FHL			5/5\par LLE sensation intact to sural/saphenous/sup peroneal/deep peroneal/post tib distributions\par 2+ DP/PT pulses with good cap refill distally [de-identified] : 3 views of the pelvis were obtained today. They show stable posterior pelvic ring fixation.
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
## 2019-07-29 NOTE — HISTORY OF PRESENT ILLNESS
[Bending] : worsened by bending [de-identified] : the patient is a very pleasant 43-year-old gentleman returns for followup after percutaneous screw placement for a posterior pelvic ring injury. He has been doing well with still has complaints of lower back pain and inability to dorsiflex the great toe on the right. He is epidural injections x2 which have been moderately helpful. He is still followed by Dr. Jennings for his hip pain. The patient states the pain is made worse with activity and relieved with rest.  aching, 3/10 [Lifting] : worsened by lifting [Recumbency] : relieved by recumbency [Rest] : relieved by rest

## 2019-11-18 ENCOUNTER — APPOINTMENT (OUTPATIENT)
Dept: ORTHOPEDIC SURGERY | Facility: CLINIC | Age: 44
End: 2019-11-18
Payer: OTHER MISCELLANEOUS

## 2019-11-19 ENCOUNTER — MOBILE ON CALL (OUTPATIENT)
Age: 44
End: 2019-11-19

## 2019-12-09 ENCOUNTER — APPOINTMENT (OUTPATIENT)
Dept: ORTHOPEDIC SURGERY | Facility: CLINIC | Age: 44
End: 2019-12-09
Payer: OTHER MISCELLANEOUS

## 2019-12-09 VITALS
BODY MASS INDEX: 26.6 KG/M2 | DIASTOLIC BLOOD PRESSURE: 96 MMHG | WEIGHT: 190 LBS | SYSTOLIC BLOOD PRESSURE: 154 MMHG | HEIGHT: 71 IN | HEART RATE: 60 BPM

## 2019-12-09 DIAGNOSIS — M70.62 TROCHANTERIC BURSITIS, LEFT HIP: ICD-10-CM

## 2019-12-09 PROCEDURE — 99213 OFFICE O/P EST LOW 20 MIN: CPT

## 2019-12-09 PROCEDURE — 72190 X-RAY EXAM OF PELVIS: CPT

## 2019-12-09 RX ORDER — DICLOFENAC SODIUM 10 MG/G
1 GEL TOPICAL DAILY
Qty: 1 | Refills: 3 | Status: ACTIVE | COMMUNITY
Start: 2019-12-09 | End: 1900-01-01

## 2019-12-09 NOTE — HISTORY OF PRESENT ILLNESS
[Bending] : worsened by bending [de-identified] : The patient is a very pleasant 44-year-old gentleman returns for followup after percutaneous screw placement for a posterior pelvic ring injury. He has been doing well with still has complaints of lower back pain and inability to dorsiflex the great toe on the right. He is epidural injections x2 which have been moderately helpful. He is still followed by Dr. Jennings for his hip pain. The patient states the pain is made worse with activity and relieved with rest.  aching, 3/10 [Lifting] : worsened by lifting [Rest] : relieved by rest [Recumbency] : relieved by recumbency

## 2019-12-09 NOTE — PHYSICAL EXAM
[de-identified] : Physical Exam:\par General: Well appearing, no acute distress, A&O\par Neurologic: A&Ox3, No focal deficits\par Head: NCAT without abrasions, lacerations, or ecchymosis to head, face, or scalp\par Respiratory: Equal chest wall expansion bilaterally, no accessory muscle use\par Lymphatic: No lymphadenopathy palpated\par Skin: Warm and dry\par Psychiatric: Normal mood and affect\par \par Lower Extremities:\par RIGHT LE: No deformities, abrasions, or other signs of trauma at hip, thigh, knee, leg, ankle or foot.  Full ROM without pain at hip, knee, ankle and toe with negative log-roll and Stinchfield tests.\par \par RLE strength: 		Hip flexion		5/5\par 			Quads			5/5\par 			Hamstrings		5/5\par 			Tib Anterior		5/5\par 			Gastroc		5/5\par 			EHL			0/5\par 			FHL			5/5\par RLE sensation intact to sural/saphenous/sup peroneal/deep peroneal/post tib distributions\par 2+ DP/PT pulses with good cap refill distally\par \par LEFT LE: No deformities, abrasions, or other signs of trauma at hip, thigh, knee, leg, ankle or foot.  Full ROM without pain at hip, knee, ankle and toe with negative log-roll and Stinchfield tests. + TTP GT c/w bursitis\par \par LLE strength: 		Hip flexion		5/5\par 			Quads			5/5\par 			Hamstrings		5/5\par 			Tib Anterior		5/5\par 			Gastroc		5/5\par 			EHL			5/5\par 			FHL			5/5\par LLE sensation intact to sural/saphenous/sup peroneal/deep peroneal/post tib distributions\par 2+ DP/PT pulses with good cap refill distally [de-identified] : 3 views of the pelvis were obtained today. They show stable posterior pelvic ring fixation.

## 2019-12-09 NOTE — DISCUSSION/SUMMARY
[de-identified] : The patient is doing well status post his posterior pelvic ring fractures. He still has lack of extension of the right great toe. I would recommend continuing physical therapy. Unfortunately the epidurals have not been helpful. A CT is likely needed as he is still symptomatic & still having significant pain. We will also order Voltaren gel for the left greater trochanteric bursitis. He will call with CT is complete and we will discuss further.\par \par The patient was given the opportunity to ask questions and all questions were answered to their satisfaction.\par \par Howie Godinez MD\par Orthopaedic Trauma Surgeon\par [facility]\par [facility] [place]\par \par \par \par

## 2019-12-09 NOTE — REASON FOR VISIT
[Worker's Compensation] : this visit is related to worker's compensation [Follow-Up Visit] : a follow-up visit for [FreeTextEntry2] : s/p percutaneous screw placement posterior pelvic ring injury. Closed treatment bilateral anterior column acetabular fractures. 3/26/19.

## 2019-12-11 ENCOUNTER — OTHER (OUTPATIENT)
Age: 44
End: 2019-12-11

## 2020-01-03 ENCOUNTER — APPOINTMENT (OUTPATIENT)
Dept: CT IMAGING | Facility: CLINIC | Age: 45
End: 2020-01-03

## 2020-06-16 ENCOUNTER — APPOINTMENT (OUTPATIENT)
Dept: ORTHOPEDIC SURGERY | Facility: CLINIC | Age: 45
End: 2020-06-16
Payer: OTHER MISCELLANEOUS

## 2020-06-16 ENCOUNTER — FORM ENCOUNTER (OUTPATIENT)
Age: 45
End: 2020-06-16

## 2020-06-16 VITALS — TEMPERATURE: 97.6 F

## 2020-06-16 VITALS
SYSTOLIC BLOOD PRESSURE: 158 MMHG | HEIGHT: 71 IN | WEIGHT: 195 LBS | HEART RATE: 66 BPM | DIASTOLIC BLOOD PRESSURE: 95 MMHG | BODY MASS INDEX: 27.3 KG/M2

## 2020-06-16 DIAGNOSIS — F43.10 POST-TRAUMATIC STRESS DISORDER, UNSPECIFIED: ICD-10-CM

## 2020-06-16 PROCEDURE — 99214 OFFICE O/P EST MOD 30 MIN: CPT

## 2020-06-16 PROCEDURE — 72190 X-RAY EXAM OF PELVIS: CPT

## 2020-06-16 NOTE — HISTORY OF PRESENT ILLNESS
[de-identified] : The patient is a very pleasant 44-year-old gentleman returns for followup after percutaneous screw placement for a posterior pelvic ring injury. He has been doing well with still has complaints of lower back pain and inability to dorsiflex the great toe on the right. He is epidural injections x2 which have been moderately helpful. He is still followed by Dr. Jennings for his hip pain. The patient states the pain is made worse with activity and relieved with rest.  aching, 3/10 \par \par He is also here to discuss a request for possible therapy referral. he states he continues to have issues with intrusive thoughts and relieving the accident. he states the last year has been very difficult for him with his loss of physical functioning as well as other personal issues. [Bending] : worsened by bending [Lifting] : worsened by lifting

## 2020-06-16 NOTE — DISCUSSION/SUMMARY
[de-identified] : 44-year-old male status post dramatic pelvic ring injury with continued radiculopathy as well as left lateral hip pain. He also having issues with either symptoms of posttraumatic stress disorder or other similar psychiatric ailment. or the likely herniated disc, we'll recommend an MRI of the lumbar spine and sacrum. We discussed possible spine surgery for removal of the protruding disc versus removal of the pelvic ring implants. Or possibly both.\par \par likely just as significant, I would recommend psychotherapy evaluation. The patient seems like he would benefit from either cognitive behavioral therapy or other form of "talk therapy." he has had a very difficult year with his loss of physical functioning as well as his change in work status due to the injury. \par \par Physiatry evaluation was also ordered for his continued bursitis. He may benefit from evaluation with them or pain management. He is very interested into alternative forms of pain management which can be beneficial and nonnarcotic based.\par \par After the MRI is complete, he'll give us a call and we will discuss the results.\par \par The patient was given the opportunity to ask questions and all questions were answered to their satisfaction.\par \par \par Howie Godinez MD\par Orthopaedic Trauma Surgeon\par [facility] [place]\par Director Orthopaedic Trauma, [facility]\par \par \par \par \par

## 2020-06-16 NOTE — PHYSICAL EXAM
[de-identified] : Physical Exam:\par General: Well appearing, no acute distress, A&O\par Neurologic: A&Ox3, No focal deficits\par Head: NCAT without abrasions, lacerations, or ecchymosis to head, face, or scalp\par Respiratory: Equal chest wall expansion bilaterally, no accessory muscle use\par Lymphatic: No lymphadenopathy palpated\par Skin: Warm and dry\par Psychiatric: Normal mood and affect\par \par Lower Extremities:\par RIGHT LE: No deformities, abrasions, or other signs of trauma at hip, thigh, knee, leg, ankle or foot.  Full ROM without pain at hip, knee, ankle and toe with negative log-roll and Stinchfield tests.\par \par RLE strength: 		Hip flexion		5/5\par 			Quads			5/5\par 			Hamstrings		5/5\par 			Tib Anterior		5/5\par 			Gastroc		5/5\par 			EHL			0/5\par 			FHL			5/5\par RLE sensation intact to sural/saphenous/sup peroneal/deep peroneal/post tib distributions\par 2+ DP/PT pulses with good cap refill distally\par \par LEFT LE: No deformities, abrasions, or other signs of trauma at hip, thigh, knee, leg, ankle or foot.  Full ROM without pain at hip, knee, ankle and toe with negative log-roll and Stinchfield tests. + TTP GT c/w bursitis\par \par LLE strength: 		Hip flexion		5/5\par 			Quads			5/5\par 			Hamstrings		5/5\par 			Tib Anterior		5/5\par 			Gastroc		5/5\par 			EHL			5/5\par 			FHL			5/5\par LLE sensation intact to sural/saphenous/sup peroneal/deep peroneal/post tib distributions\par 2+ DP/PT pulses with good cap refill distally [de-identified] : 3 views of the pelvis were obtained today. They show stable posterior pelvic ring fixation.\par \par CT scan was also reviewed. There is a herniated disc possibly pushing on the right S1 nerve root. There is also a question of anterior protrusion of the S1 screw pass the sacral ala.

## 2020-06-16 NOTE — REASON FOR VISIT
[Follow-Up Visit] : a follow-up visit for [Worker's Compensation] : this visit is related to worker's compensation [FreeTextEntry2] : Pelvic pain

## 2020-06-23 ENCOUNTER — FORM ENCOUNTER (OUTPATIENT)
Age: 45
End: 2020-06-23

## 2020-07-08 ENCOUNTER — APPOINTMENT (OUTPATIENT)
Dept: ORTHOPEDIC SURGERY | Facility: CLINIC | Age: 45
End: 2020-07-08
Payer: OTHER MISCELLANEOUS

## 2020-07-08 VITALS
DIASTOLIC BLOOD PRESSURE: 93 MMHG | BODY MASS INDEX: 27.3 KG/M2 | WEIGHT: 195 LBS | HEIGHT: 71 IN | SYSTOLIC BLOOD PRESSURE: 145 MMHG | HEART RATE: 61 BPM

## 2020-07-08 VITALS — TEMPERATURE: 97.8 F

## 2020-07-08 PROCEDURE — 99214 OFFICE O/P EST MOD 30 MIN: CPT

## 2020-07-10 NOTE — DISCUSSION/SUMMARY
[de-identified] : 44-year-old male status post dramatic pelvic ring injury with continued radiculopathy as well as left lateral hip pain.  MRI of the lumbar spine and sacrum shows impingement on the S1 nerve roots which may be causing some of his pain.  I would recommend discussion with the spine surgery service to discuss if the disc herniation and his pain warrants surgical intervention. We discussed possible spine surgery for removal of the protruding disc versus removal of the pelvic ring implants. Or possibly both.  After discussion with the spine service, we can determine best on how to proceed.\par \par Physiatry evaluation was also ordered for his continued bursitis. He may benefit from evaluation with them or pain management. He is very interested into alternative forms of pain management which can be beneficial and nonnarcotic based.\par \par \par The patient was given the opportunity to ask questions and all questions were answered to their satisfaction.\par \par \par Howie Godinez MD\par Orthopaedic Trauma Surgeon\par [facility] [place]\par Director Orthopaedic Trauma, [facility]\par \par \par \par \par

## 2020-07-10 NOTE — PHYSICAL EXAM
[de-identified] : Outside MRI reviewed.  MRI shows focal herniation at the L5-S1 junction with impingement of the passing S1 nerve roots. [de-identified] : Physical Exam:\par General: Well appearing, no acute distress, A&O\par Neurologic: A&Ox3, No focal deficits\par Head: NCAT without abrasions, lacerations, or ecchymosis to head, face, or scalp\par Respiratory: Equal chest wall expansion bilaterally, no accessory muscle use\par Lymphatic: No lymphadenopathy palpated\par Skin: Warm and dry\par Psychiatric: Normal mood and affect\par \par Lower Extremities:\par RIGHT LE: No deformities, abrasions, or other signs of trauma at hip, thigh, knee, leg, ankle or foot.  Full ROM without pain at hip, knee, ankle and toe with negative log-roll and Stinchfield tests.\par \par RLE strength: 		Hip flexion		5/5\par 			Quads			5/5\par 			Hamstrings		5/5\par 			Tib Anterior		5/5\par 			Gastroc		5/5\par 			EHL			0/5\par 			FHL			5/5\par RLE sensation intact to sural/saphenous/sup peroneal/deep peroneal/post tib distributions\par 2+ DP/PT pulses with good cap refill distally\par \par LEFT LE: No deformities, abrasions, or other signs of trauma at hip, thigh, knee, leg, ankle or foot.  Full ROM without pain at hip, knee, ankle and toe with negative log-roll and Stinchfield tests. + TTP GT c/w bursitis\par \par LLE strength: 		Hip flexion		5/5\par 			Quads			5/5\par 			Hamstrings		5/5\par 			Tib Anterior		5/5\par 			Gastroc		5/5\par 			EHL			5/5\par 			FHL			5/5\par LLE sensation intact to sural/saphenous/sup peroneal/deep peroneal/post tib distributions\par 2+ DP/PT pulses with good cap refill distally

## 2020-07-10 NOTE — REASON FOR VISIT
[Follow-Up Visit] : a follow-up visit for [FreeTextEntry2] : Back pain. Here to discuss lumbar MRI results.

## 2020-07-10 NOTE — HISTORY OF PRESENT ILLNESS
[de-identified] : The patient is a very pleasant 44-year-old gentleman returns for followup after percutaneous screw placement for a posterior pelvic ring injury. He has been doing well with still has complaints of lower back pain and inability to dorsiflex the great toe on the right. He is epidural injections x2 which have been moderately helpful. He is still followed by Dr. Jennings for his hip pain. The patient states the pain is made worse with activity and relieved with rest.  aching, 3/10 \par \par He is also here to discuss his recent MRI.

## 2020-12-09 ENCOUNTER — APPOINTMENT (OUTPATIENT)
Dept: ORTHOPEDIC SURGERY | Facility: CLINIC | Age: 45
End: 2020-12-09

## 2020-12-21 NOTE — PRE-OP CHECKLIST - SKIN PREP
[facility]  Office: [address], DO, Wilder Smart, DO, Jalyn Dinh, DO, Em Aidee Blood, DO, Homero Sanchez MD, Babatunde Lyons MD, Bernadette Alva MD, Ricardo Frias MD, Darnell Burnett MD, Carmita aMrroquin MD, Janel Pollack MD, Mamie Chandler MD, Paul Herbert MD, Nan Robert, DO, Juvencio Yao MD, Roxi Guan MD, Harlan Guerrero, DO, Leah Vasquez MD,  Sia Diaz, DO, Low Puente MD, Saida Tavares MD, Christian Preston, CNP, [facility] Center[facility] Nicole, CNP, Martínez Vanegas, CNP, Margaret Mask, CNS, Eric Norco, CNP, Cheryl Parada, CNP, Sharon Loo, CNP, Jaya Yepez, CNP, King Medellin, CNP, Lendel Nissen, PA-C, Shahab Gross, [facility], Jeffery Clarke, CNP, Ketty Blunt, CNP, Christina Kahn, CNP, Tera Claudio, CNP, Romario Augustine, CNP         [facility]   [address] [facility]    Progress Note    12/21/2020    8:56 AM    Name:   Niesha Boothe  MRN:     [id number]     Acct:      [de-identified]   Room:   1575/4235-49   Day:  2  Admit Date:  12/19/2020  5:24 PM    PCP:   No primary care provider on file. Code Status:  Full Code    Subjective:     C/C: Flank pain  Interval History Status: improved. Patient seen and evaluated in hemodialysis unit. Discussed with Dr. Joy Slater, patient only needed half a round of hemodialysis today. Patient is to be reassessed tomorrow and most likely hemodialysis catheter will be explanted. No other patient concerns at this time.       Brief History:     Per records: Trisha Barber a 59-year-old male who presented to outlying facility with complaints of right flank pain.  He states he has had increased pain over the last several days with decreased urine output.  A Trevino catheter was placed in which 8 L of urine was drained.  ED work-up at [place] revealed a BUN  <336 with a creatinine of 7.88.  He has no known history of nephrologic problems. Cisco Palmer has had urinary retention in the past but takes no medications currently. Cisco Palmer does endorse a 1 month history of right lower leg edema.  He has a remote history of PE and was on Eliquis approximately 7 years ago x 6 months.  Currently, patient denies nausea, vomiting, chest pain, fever, chills or shortness of breath.  He is employed as a , is independent in his ADLs, and takes no home medications.  He denies use of alcohol, tobacco, or recreational drugs. Review of Systems:     Constitutional:  negative for chills, fevers, sweats  Respiratory:  negative for cough, dyspnea on exertion, shortness of breath, wheezing  Cardiovascular:  negative for chest pain, chest pressure/discomfort, lower extremity edema, palpitations  Gastrointestinal:  negative for abdominal pain, constipation, diarrhea, nausea, vomiting  Neurological:  negative for dizziness, headache    Medications: Allergies:  No Known Allergies    Current Meds:   Scheduled Meds:    tamsulosin  0.4 mg Oral Daily    cefTRIAXone (ROCEPHIN) IV  1 g Intravenous Q24H    sodium chloride flush  10 mL Intravenous 2 times per day     Continuous Infusions:    sodium chloride 75 mL/hr at 12/20/20 2208     PRN Meds: heparin (porcine), heparin (porcine), sodium chloride flush, nicotine, promethazine **OR** ondansetron, acetaminophen **OR** acetaminophen    Data:     Past Medical History:   has no past medical history on file. Social History:   reports that he has never smoked. He has never used smokeless tobacco.     Family History: No family history on file.     Vitals: BP (!) 151/99   Pulse 94   Temp 100.1 °F (37.8 °C) (Oral)   Resp 22   Wt 255 lb 11.7 oz (116 kg)   SpO2 92%   Temp (24hrs), Av.8 °F (37.1 °C), Min:98.2 °F (36.8 °C), Max:100.1 °F (37.8 °C)    Recent Labs     20  2115   POCGLU 167*       I/O (24Hr):     Intake/Output Summary (Last 24 hours) at 2020 0856  Last data filed at 2020 0759  Gross per 24 hour   Intake 3598 ml   Output 7150 ml   Net -3552 ml       Labs:  Hematology:  Recent Labs     20  0620  0607   WBC 13.7* 18.3*   RBC 4.12* 3.68*   HGB 12.1* 10.8*   HCT 37.0* 33.2*   MCV 89.8 90.2   MCH 29.4 29.3   MCHC 32.7 32.5   RDW 12.5 13.0    322   MPV 9.3 9.9   INR  --  1.0     Chemistry:  Recent Labs     20  0630 20  2227 20  0607    133* 133*   K 5.0 5.7* 5.1   CL 99 102 98   CO2 17* 17* 20   GLUCOSE 160* 171* 137*   BUN >336* 126* 136*   CREATININE 7.88* 8.46* 8.28*   MG  --   --  1.8   ANIONGAP 20* 14 15   LABGLOM 7* 6* 7*   GFRAA 8* 8* 8*   CALCIUM 10.0 9.4 8.9   LACTACIDWB NOT REPORTED  --   --      Recent Labs     20  0630 20  2115 20  0620  1324   PROT 8.6*  --  6.0* 6.2*   LABALBU 4.3  --  3.1*  --    AST 10  --  7  --    ALT 14  --  10  --    ALKPHOS 94  --  73  --    BILITOT 0.33  --  0.37  --    LIPASE 110*  --   --   --    POCGLU  --  167*  --   --      ABG:  Lab Results   Component Value Date    PHART 7.384 2020    CMO3OCZ 30.0 2020    PO2ART 70.5 2020    ZEU5OQU 17.5 2020    NBEA 6.1 2020    PBEA NOT REPORTED 2020    Y7LQIFYT 94.2 2020    FIO2 21 2020     No results found for: SPECIAL  No results found for: CULTURE    Radiology:  Ct Abdomen Pelvis Wo Contrast    Addendum Date: 2020 done/CHD wipes ADDENDUM: Noted within the body of the report there is a 2 mm left lower lobe nodule likely requiring no specific follow-up. Please refer to guidelines below. Guidelines for follow-up and management of pulmonary nodules found on abdomen CT: <6 mm - No follow up recommend on the basis of the estimated low risk of malignancy. 6-8-mm - recommend follow-up chest CT after an appropriate interval (3-12 months depending on clinical risk). >8mm - immediate chest CT for further evaluation. Radiology 2017 http://pubs. rsna.org/doi/full/10.1148/radiol. [email or web address]     Result Date: 12/19/2020  Severe bilateral hydronephrosis. Markedly distended urinary bladder. No obstructing renal calculus is visualized. Stranding and fluid surrounding the right kidney. Findings could be related to back flow from severe chronic distention of the urinary bladder. Infection is not excluded on the basis of this examination. Urology consultation should be considered. Us Renal Complete    Result Date: 12/20/2020  1. Severe bilateral hydronephrosis, similar to the prior exam.  This is likely longstanding as there is bilateral cortical atrophy. Of note, there is fluid around the right kidney, as seen on yesterday's exam, concerning for a calyceal rupture. 2. Abnormal bladder wall thickening, most likely due to reactive changes from chronic bladder outlet obstruction when correlated with yesterday's CT. However, malignancy cannot be excluded and further evaluation with cystoscopy should be considered. Ir Non Tunneled Cath Wo Port Replacement    Result Date: 12/20/2020  Successful ultrasound and fluoroscopy guided non-tunneled hemodialysis catheter placement.        Physical Examination:        General appearance:  alert, cooperative and no distress  Mental Status:  oriented to person, place and time and normal affect  Lungs:  clear to auscultation bilaterally, normal effort  Heart:  regular rate and rhythm, no murmur Abdomen:  soft, nontender, nondistended, normal bowel sounds, no masses, hepatomegaly, splenomegaly  Extremities: Right lower extremity +1 nonpitting edema, redness, tenderness in the calves  Skin:  no gross lesions, rashes, induration, right chest hemodialysis access    Assessment:        Hospital Problems           Last Modified POA    * (Principal) Acute kidney injury ([facility] [address] 12/19/2020 Yes    Obstruction of kidney 12/19/2020 Yes    Bilateral hydronephrosis 12/19/2020 Yes    Bladder obstruction 12/19/2020 Yes    Leg edema, right 12/19/2020 Yes          Plan:        1. Acute kidney injury: Worsened by bilateral hydronephrosis requiring hemodialysis. Improving, discussed with nephrology. Patient will most likely have hemodialysis catheter explanted tomorrow. We will continue to trend BMP, monitor urinary output with Trevino catheter  2. Back pain: Worsened by acute kidney injury with bilateral hydronephrosis. Resolved  3. Acute urinary retention with UTI: Rocephin, Flomax, continue Trevino catheter with urology consult  4. GI/DVT prophylaxis: Heparin, Pepcid  5. Right lower extremity swelling: Bilateral venous duplex completed, left lower extremity peroneal acute DVT identified   6.  Essential hypertension: Start amlodipine   7. likely discharge home tomorrow with close follow-up with nephrology as outpatient once hemodialysis catheter is explanted     SHARON Barakat NP  12/21/2020  8:56 AM

## 2021-01-19 ENCOUNTER — APPOINTMENT (OUTPATIENT)
Dept: ORTHOPEDIC SURGERY | Facility: CLINIC | Age: 46
End: 2021-01-19
Payer: OTHER MISCELLANEOUS

## 2021-01-19 PROCEDURE — 99213 OFFICE O/P EST LOW 20 MIN: CPT | Mod: 95

## 2021-01-19 NOTE — DISCUSSION/SUMMARY
[de-identified] : 45-year-old male status post dramatic pelvic ring injury with continued radiculopathy as well as left lateral hip pain.  MRI of the lumbar spine and sacrum shows impingement on the S1 nerve roots which may be causing some of his pain.  As he still having pain despite a extensive period of conservative management, I offered to remove the sacroiliac screws and he wishes to proceed.  We discussed that it is difficult to ascertain how much pain relief he will have as hardware removal is notoriously variable in its pain relief.  If he continues to have pain after removing the implants, I would recommend sacroiliac joint injection as both a diagnostic and therapeutic modality to see if SI fusion would be beneficial.\par \par It was explained to the patient that any surgical procedure carries with it the risks of loss of limb or loss of life. Medical complications include but are not limited to death or disability from a heart attack, stroke, GI bleeding, thrombophlebitis and pulmonary embolism, sepsis, adverse reactions including death due to blood transfusions, allergy or adverse drug reaction. There are other rare, unknown and uncommon systemic conditions that could also adversely affect the systemic outcome. Local complications include but are not limited to wound dehiscence, deep infection, failure of fixation or reconstruction, damage to nerves and vessels which could be temporary or permanent, as well as other rare, uncommon and unknown local complications that may necessitate re-operation, more complex orthopaedic reconstructions or amputation.\par \par Despite the risks, he wishes to proceed.\par \par The patient was given the opportunity to ask questions and all questions were answered to their satisfaction.\par \par \par Howie Godinez MD\par Orthopaedic Trauma Surgeon\par [facility] [place]\par Director Orthopaedic Trauma, [facility]\par \par \par \par \par

## 2021-01-19 NOTE — HISTORY OF PRESENT ILLNESS
[de-identified] : The patient is a very pleasant 45-year-old gentleman returns for telehealth followup after percutaneous screw placement for a posterior pelvic ring injury. He still has complaints of lower back pain and inability to dorsiflex the great toe on the right. He is epidural injections x2 which have been moderately helpful. He is still followed by Dr. Jennings for his hip pain. The patient states the pain is made worse with activity and relieved with rest.  shante, 3/10 \par \par Telehealth performed today with the patient at his home in [place] and myself in our [place] office.  Verbal consent was obtained today [Bending] : worsened by bending [Lifting] : worsened by lifting [Weight Bearing] : worsened by weight bearing [Recumbency] : relieved by recumbency [Rest] : relieved by rest

## 2021-01-19 NOTE — PHYSICAL EXAM
[de-identified] : Physical Exam was performed today via telehealth with the patient describing his symptoms and comparing it to previous in person visit\par \par General: Well appearing, no acute distress, A&O\par Neurologic: A&Ox3, No focal deficits\par Head: NCAT without abrasions, lacerations, or ecchymosis to head, face, or scalp\par Respiratory: Equal chest wall expansion bilaterally, no accessory muscle use\par Lymphatic: No lymphadenopathy palpated\par Skin: Warm and dry\par Psychiatric: Normal mood and affect\par \par Lower Extremities:\par RIGHT LE: No deformities, abrasions, or other signs of trauma at hip, thigh, knee, leg, ankle or foot.  Full ROM without pain at hip, knee, ankle and toe with negative log-roll and Stinchfield tests.\par \par RLE strength: 		Hip flexion		5/5\par 			Quads			5/5\par 			Hamstrings		5/5\par 			Tib Anterior		5/5\par 			Gastroc		5/5\par 			EHL			0/5\par 			FHL			5/5\par RLE sensation intact to sural/saphenous/sup peroneal/deep peroneal/post tib distributions\par 2+ DP/PT pulses with good cap refill distally\par \par LEFT LE: No deformities, abrasions, or other signs of trauma at hip, thigh, knee, leg, ankle or foot.  Full ROM without pain at hip, knee, ankle and toe with negative log-roll and Stinchfield tests. + TTP GT c/w bursitis\par \par LLE strength: 		Hip flexion		5/5\par 			Quads			5/5\par 			Hamstrings		5/5\par 			Tib Anterior		5/5\par 			Gastroc		5/5\par 			EHL			5/5\par 			FHL			5/5\par LLE sensation intact to sural/saphenous/sup peroneal/deep peroneal/post tib distributions\par 2+ DP/PT pulses with good cap refill distally

## 2021-02-08 NOTE — ED PROVIDER NOTE - NS HIV RISK FACTOR YES
--------------------------------------------------------------------------------------------------------------  [facility] [email or web address]  Exline for the patient portal  See test results and make virtual visits with your primary care provider    ----------------  Thank you for choosing Advocate Hayward Area Memorial Hospital - Hayward [phone] or email [email or web address]    --------------------------------------------------------------------------------------------------------------  over-the-counter without prescription at [facility] in [place] and [facility]; often available at [employer]/natural food/health stores, and through [employer].com.  For Kids age 6 return-to-school clearance, please contact your Atrium Health Wake Forest Baptist Wilkes [facility], and to [facility]: [phone]  [facility]: [phone]  [facility]: [phone]  [facility]: [phone]  ----------------------------    AS OF 12/23/20, [facility]note 3]t [facility] [place] at [email or web address]  ----------------------------       Declined

## 2021-02-10 ENCOUNTER — OUTPATIENT (OUTPATIENT)
Dept: OUTPATIENT SERVICES | Facility: HOSPITAL | Age: 46
LOS: 1 days | End: 2021-02-10
Payer: COMMERCIAL

## 2021-02-10 ENCOUNTER — RESULT REVIEW (OUTPATIENT)
Age: 46
End: 2021-02-10

## 2021-02-10 VITALS
WEIGHT: 184.09 LBS | HEIGHT: 71 IN | HEART RATE: 63 BPM | SYSTOLIC BLOOD PRESSURE: 140 MMHG | DIASTOLIC BLOOD PRESSURE: 80 MMHG | TEMPERATURE: 99 F | RESPIRATION RATE: 16 BRPM

## 2021-02-10 DIAGNOSIS — Z87.81 PERSONAL HISTORY OF (HEALED) TRAUMATIC FRACTURE: Chronic | ICD-10-CM

## 2021-02-10 DIAGNOSIS — R10.2 PELVIC AND PERINEAL PAIN: ICD-10-CM

## 2021-02-10 DIAGNOSIS — Z01.818 ENCOUNTER FOR OTHER PREPROCEDURAL EXAMINATION: ICD-10-CM

## 2021-02-10 DIAGNOSIS — S32.10XD UNSPECIFIED FRACTURE OF SACRUM, SUBSEQUENT ENCOUNTER FOR FRACTURE WITH ROUTINE HEALING: ICD-10-CM

## 2021-02-10 DIAGNOSIS — Z29.9 ENCOUNTER FOR PROPHYLACTIC MEASURES, UNSPECIFIED: ICD-10-CM

## 2021-02-10 LAB
ALBUMIN SERPL ELPH-MCNC: 4.5 G/DL — SIGNIFICANT CHANGE UP (ref 3.3–5.2)
ALP SERPL-CCNC: 64 U/L — SIGNIFICANT CHANGE UP (ref 40–120)
ALT FLD-CCNC: 53 U/L — HIGH
ANION GAP SERPL CALC-SCNC: 12 MMOL/L — SIGNIFICANT CHANGE UP (ref 5–17)
APTT BLD: 34.1 SEC — SIGNIFICANT CHANGE UP (ref 27.5–35.5)
AST SERPL-CCNC: 133 U/L — HIGH
BASOPHILS # BLD AUTO: 0.06 K/UL — SIGNIFICANT CHANGE UP (ref 0–0.2)
BASOPHILS NFR BLD AUTO: 0.8 % — SIGNIFICANT CHANGE UP (ref 0–2)
BILIRUB SERPL-MCNC: 0.5 MG/DL — SIGNIFICANT CHANGE UP (ref 0.4–2)
BLD GP AB SCN SERPL QL: SIGNIFICANT CHANGE UP
BUN SERPL-MCNC: 13 MG/DL — SIGNIFICANT CHANGE UP (ref 8–20)
CALCIUM SERPL-MCNC: 8.9 MG/DL — SIGNIFICANT CHANGE UP (ref 8.6–10.2)
CHLORIDE SERPL-SCNC: 103 MMOL/L — SIGNIFICANT CHANGE UP (ref 98–107)
CO2 SERPL-SCNC: 26 MMOL/L — SIGNIFICANT CHANGE UP (ref 22–29)
CREAT SERPL-MCNC: 1.11 MG/DL — SIGNIFICANT CHANGE UP (ref 0.5–1.3)
EOSINOPHIL # BLD AUTO: 0.19 K/UL — SIGNIFICANT CHANGE UP (ref 0–0.5)
EOSINOPHIL NFR BLD AUTO: 2.5 % — SIGNIFICANT CHANGE UP (ref 0–6)
GLUCOSE SERPL-MCNC: 86 MG/DL — SIGNIFICANT CHANGE UP (ref 70–99)
HCT VFR BLD CALC: 43 % — SIGNIFICANT CHANGE UP (ref 39–50)
HGB BLD-MCNC: 14.7 G/DL — SIGNIFICANT CHANGE UP (ref 13–17)
IMM GRANULOCYTES NFR BLD AUTO: 0.3 % — SIGNIFICANT CHANGE UP (ref 0–1.5)
INR BLD: 1.07 RATIO — SIGNIFICANT CHANGE UP (ref 0.88–1.16)
LYMPHOCYTES # BLD AUTO: 1.84 K/UL — SIGNIFICANT CHANGE UP (ref 1–3.3)
LYMPHOCYTES # BLD AUTO: 24 % — SIGNIFICANT CHANGE UP (ref 13–44)
MCHC RBC-ENTMCNC: 31.8 PG — SIGNIFICANT CHANGE UP (ref 27–34)
MCHC RBC-ENTMCNC: 34.2 GM/DL — SIGNIFICANT CHANGE UP (ref 32–36)
MCV RBC AUTO: 93.1 FL — SIGNIFICANT CHANGE UP (ref 80–100)
MONOCYTES # BLD AUTO: 0.89 K/UL — SIGNIFICANT CHANGE UP (ref 0–0.9)
MONOCYTES NFR BLD AUTO: 11.6 % — SIGNIFICANT CHANGE UP (ref 2–14)
NEUTROPHILS # BLD AUTO: 4.66 K/UL — SIGNIFICANT CHANGE UP (ref 1.8–7.4)
NEUTROPHILS NFR BLD AUTO: 60.8 % — SIGNIFICANT CHANGE UP (ref 43–77)
PLATELET # BLD AUTO: 190 K/UL — SIGNIFICANT CHANGE UP (ref 150–400)
POTASSIUM SERPL-MCNC: 4.2 MMOL/L — SIGNIFICANT CHANGE UP (ref 3.5–5.3)
POTASSIUM SERPL-SCNC: 4.2 MMOL/L — SIGNIFICANT CHANGE UP (ref 3.5–5.3)
PROT SERPL-MCNC: 7.2 G/DL — SIGNIFICANT CHANGE UP (ref 6.6–8.7)
PROTHROM AB SERPL-ACNC: 12.4 SEC — SIGNIFICANT CHANGE UP (ref 10.6–13.6)
RBC # BLD: 4.62 M/UL — SIGNIFICANT CHANGE UP (ref 4.2–5.8)
RBC # FLD: 14.2 % — SIGNIFICANT CHANGE UP (ref 10.3–14.5)
SODIUM SERPL-SCNC: 141 MMOL/L — SIGNIFICANT CHANGE UP (ref 135–145)
WBC # BLD: 7.66 K/UL — SIGNIFICANT CHANGE UP (ref 3.8–10.5)
WBC # FLD AUTO: 7.66 K/UL — SIGNIFICANT CHANGE UP (ref 3.8–10.5)

## 2021-02-10 PROCEDURE — 71046 X-RAY EXAM CHEST 2 VIEWS: CPT | Mod: 26

## 2021-02-10 PROCEDURE — G0463: CPT

## 2021-02-10 PROCEDURE — 93005 ELECTROCARDIOGRAM TRACING: CPT

## 2021-02-10 PROCEDURE — 71046 X-RAY EXAM CHEST 2 VIEWS: CPT

## 2021-02-10 PROCEDURE — 93010 ELECTROCARDIOGRAM REPORT: CPT

## 2021-02-10 RX ORDER — MELOXICAM 15 MG/1
1 TABLET ORAL
Qty: 0 | Refills: 0 | DISCHARGE

## 2021-02-10 NOTE — H&P PST ADULT - NSICDXPROBLEM_GEN_ALL_CORE_FT
PROBLEM DIAGNOSES  Problem: Need for prophylactic measure  Assessment and Plan: caprini score 4, moderate risk for dvt SCD ordered, surgical team to assess for dvt prophylaxis        PROBLEM DIAGNOSES  Problem: Pelvic and perineal pain  Assessment and Plan: preop assessment,     Problem: Need for prophylactic measure  Assessment and Plan: caprini score 4, moderate risk for dvt SCD ordered, surgical team to assess for dvt prophylaxis        PROBLEM DIAGNOSES  Problem: Unspecified fracture of sacrum, subsequent encounter for fracture with routine healing  Assessment and Plan: preop assessment, removal of deep implants in pelvis on 2/17    Problem: Pelvic and perineal pain  Assessment and Plan: preop assessment, removal of deep implants in pelvis 2/17    Problem: Need for prophylactic measure  Assessment and Plan: caprini score 4, moderate risk for dvt SCD ordered, surgical team to assess for dvt prophylaxis

## 2021-02-10 NOTE — H&P PST ADULT - ASSESSMENT
46 yo M 46 yo M current smoker (0.5 pk/day x20 yrs) s/p dramatic pelvic ring injury on 2019 and percutaneous screws placement on 3/26/2019 c/o continued severe radiculopathy in right LE, right foot and right big toe, lumbosacral spine pain and bilateral hip pain. Pt describes his pain as constant and severe, 10/10 pain level at its worst. MRI of the lumbar spine and sacrum shows impingement on the S1 nerve roots. Pt reports he had 2 epidural shots w/o significant relief. He takes tylenol w/o significant relief. Ambulates w/cane. Preop assessment prior to removal of deep implants in pelvis w/Dr Godinez on     OPIOID RISK TOOL    GLORIA EACH BOX THAT APPLIES AND ADD TOTALS AT THE END    FAMILY HISTORY OF SUBSTANCE ABUSE                 FEMALE         MALE                                                Alcohol                             [  ]1 pt          [  ]3pts                                               Illegal Durgs                     [  ]2 pts        [  ]3pts                                               Rx Drugs                           [  ]4 pts        [  ]4 pts    PERSONAL HISTORY OF SUBSTANCE ABUSE                                                                                          Alcohol                             [  ]3 pts       [  ]3 pts                                               Illegal Drugs                     [  ]4 pts        [  ]4 pts                                               Rx Drugs                           [  ]5 pts        [  ]5 pts    AGE BETWEEN 16-45 YEARS                                      [  ]1 pt         [  ]1 pt    HISTORY OF PREADOLESCENT   SEXUAL ABUSE                                                             [  ]3 pts        [  ]0pts    PSYCHOLOGICAL DISEASE                     ADD, OCD, Bipolar, Schizophrenia        [  ]2 pts         [  ]2 pts                      Depression                                               [  ]1 pt           [  ]1 pt           SCORING TOTAL   (add numbers and type here)              ( 0 )                                     A score of 3 or lower indicated LOW risk for future opioid abuse  A score of 4 to 7 indicated moderate risk for future opioid abuse  A score of 8 or higher indicates a high risk for opioid abuse    CAPRINI SCORE [CLOT]    AGE RELATED RISK FACTORS                                                       MOBILITY RELATED FACTORS  [ x] Age 41-60 years                                            (1 Point)                  [ ] Bed rest                                                        (1 Point)  [ ] Age: 61-74 years                                           (2 Points)                 [ ] Plaster cast                                                   (2 Points)  [ ] Age= 75 years                                              (3 Points)                 [ ] Bed bound for more than 72 hours                 (2 Points)    DISEASE RELATED RISK FACTORS                                               GENDER SPECIFIC FACTORS  [ ] Edema in the lower extremities                       (1 Point)                  [ ] Pregnancy                                                     (1 Point)  [ ] Varicose veins                                               (1 Point)                  [ ] Post-partum < 6 weeks                                   (1 Point)             [x ] BMI > 25 Kg/m2                                            (1 Point)                  [ ] Hormonal therapy  or oral contraception          (1 Point)                 [ ] Sepsis (in the previous month)                        (1 Point)                  [ ] History of pregnancy complications                 (1 point)  [ ] Pneumonia or serious lung disease                                               [ ] Unexplained or recurrent                     (1 Point)           (in the previous month)                               (1 Point)  [ ] Abnormal pulmonary function test                     (1 Point)                 SURGERY RELATED RISK FACTORS  [ ] Acute myocardial infarction                              (1 Point)                 [ ]  Section                                             (1 Point)  [ ] Congestive heart failure (in the previous month)  (1 Point)               [ ] Minor surgery                                                  (1 Point)   [ ] Inflammatory bowel disease                             (1 Point)                 [ ] Arthroscopic surgery                                        (2 Points)  [ ] Central venous access                                      (2 Points)                [x ] General surgery lasting more than 45 minutes   (2 Points)       [ ] Stroke (in the previous month)                          (5 Points)               [ ] Elective arthroplasty                                         (5 Points)                                                                                                                                               HEMATOLOGY RELATED FACTORS                                                 TRAUMA RELATED RISK FACTORS  [ ] Prior episodes of VTE                                     (3 Points)                [ ] Fracture of the hip, pelvis, or leg                       (5 Points)  [ ] Positive family history for VTE                         (3 Points)                 [ ] Acute spinal cord injury (in the previous month)  (5 Points)  [ ] Prothrombin 04305 A                                     (3 Points)                 [ ] Paralysis  (less than 1 month)                             (5 Points)  [ ] Factor V Leiden                                             (3 Points)                  [ ] Multiple Trauma within 1 month                        (5 Points)  [ ] Lupus anticoagulants                                     (3 Points)                                                           [ ] Anticardiolipin antibodies                               (3 Points)                                                       [ ] High homocysteine in the blood                      (3 Points)                                             [ ] Other congenital or acquired thrombophilia      (3 Points)                                                [ ] Heparin induced thrombocytopenia                  (3 Points)                                          Total Score [    4      ]    Caprini Score 0 - 2:  Low Risk, No VTE Prophylaxis required for most patients, encourage ambulation  Caprini Score 3 - 6:  At Risk, pharmacologic VTE prophylaxis is indicated for most patients (in the absence of a contraindication)  Caprini Score Greater than or = 7:  High Risk, pharmacologic VTE prophylaxis is indicated for most patients (in the absence of a contraindication) 46 yo M current smoker (0.5 pk/day x20 yrs) s/p dramatic pelvic ring injury on 2019 followed by percutaneous screws placement on 3/26/2019 presents with c/o continued severe radiculopathy in right LE, right foot and right big toe, lumbosacral spine pain and bilateral hip pain. Pt describes his pain as constant and severe, 10/10 pain level at its worst. MRI of the lumbar spine and sacrum shows impingement on the S1 nerve roots. Pt reports he had 2 epidural shots w/o significant relief. He takes tylenol w/o significant relief. Ambulates w/cane. Preop assessment prior to removal of deep implants in pelvis w/Dr Godinez on       OPIOID RISK TOOL    GLORIA EACH BOX THAT APPLIES AND ADD TOTALS AT THE END    FAMILY HISTORY OF SUBSTANCE ABUSE                 FEMALE         MALE                                                Alcohol                             [  ]1 pt          [  ]3pts                                               Illegal Drugs                     [  ]2 pts        [  ]3pts                                               Rx Drugs                           [  ]4 pts        [  ]4 pts    PERSONAL HISTORY OF SUBSTANCE ABUSE                                                                                          Alcohol                             [  ]3 pts       [  ]3 pts                                               Illegal Drugs                     [  ]4 pts        [  ]4 pts                                               Rx Drugs                           [  ]5 pts        [  ]5 pts    AGE BETWEEN 16-45 YEARS                                      [  ]1 pt         [  ]1 pt    HISTORY OF PREADOLESCENT   SEXUAL ABUSE                                                             [  ]3 pts        [  ]0pts    PSYCHOLOGICAL DISEASE                     ADD, OCD, Bipolar, Schizophrenia        [  ]2 pts         [  ]2 pts                      Depression                                               [  ]1 pt           [  ]1 pt           SCORING TOTAL   (add numbers and type here)              ( 0 )                                     A score of 3 or lower indicated LOW risk for future opioid abuse  A score of 4 to 7 indicated moderate risk for future opioid abuse  A score of 8 or higher indicates a high risk for opioid abuse    CAPRINI SCORE [CLOT]    AGE RELATED RISK FACTORS                                                       MOBILITY RELATED FACTORS  [ x] Age 41-60 years                                            (1 Point)                  [ ] Bed rest                                                        (1 Point)  [ ] Age: 61-74 years                                           (2 Points)                 [ ] Plaster cast                                                   (2 Points)  [ ] Age= 75 years                                              (3 Points)                 [ ] Bed bound for more than 72 hours                 (2 Points)    DISEASE RELATED RISK FACTORS                                               GENDER SPECIFIC FACTORS  [ ] Edema in the lower extremities                       (1 Point)                  [ ] Pregnancy                                                     (1 Point)  [ ] Varicose veins                                               (1 Point)                  [ ] Post-partum < 6 weeks                                   (1 Point)             [x ] BMI > 25 Kg/m2                                            (1 Point)                  [ ] Hormonal therapy  or oral contraception          (1 Point)                 [ ] Sepsis (in the previous month)                        (1 Point)                  [ ] History of pregnancy complications                 (1 point)  [ ] Pneumonia or serious lung disease                                               [ ] Unexplained or recurrent                     (1 Point)           (in the previous month)                               (1 Point)  [ ] Abnormal pulmonary function test                     (1 Point)                 SURGERY RELATED RISK FACTORS  [ ] Acute myocardial infarction                              (1 Point)                 [ ]  Section                                             (1 Point)  [ ] Congestive heart failure (in the previous month)  (1 Point)               [ ] Minor surgery                                                  (1 Point)   [ ] Inflammatory bowel disease                             (1 Point)                 [ ] Arthroscopic surgery                                        (2 Points)  [ ] Central venous access                                      (2 Points)                [x ] General surgery lasting more than 45 minutes   (2 Points)       [ ] Stroke (in the previous month)                          (5 Points)               [ ] Elective arthroplasty                                         (5 Points)                                                                                                                                               HEMATOLOGY RELATED FACTORS                                                 TRAUMA RELATED RISK FACTORS  [ ] Prior episodes of VTE                                     (3 Points)                [ ] Fracture of the hip, pelvis, or leg                       (5 Points)  [ ] Positive family history for VTE                         (3 Points)                 [ ] Acute spinal cord injury (in the previous month)  (5 Points)  [ ] Prothrombin 33071 A                                     (3 Points)                 [ ] Paralysis  (less than 1 month)                             (5 Points)  [ ] Factor V Leiden                                             (3 Points)                  [ ] Multiple Trauma within 1 month                        (5 Points)  [ ] Lupus anticoagulants                                     (3 Points)                                                           [ ] Anticardiolipin antibodies                               (3 Points)                                                       [ ] High homocysteine in the blood                      (3 Points)                                             [ ] Other congenital or acquired thrombophilia      (3 Points)                                                [ ] Heparin induced thrombocytopenia                  (3 Points)                                          Total Score [    4      ]    Caprini Score 0 - 2:  Low Risk, No VTE Prophylaxis required for most patients, encourage ambulation  Caprini Score 3 - 6:  At Risk, pharmacologic VTE prophylaxis is indicated for most patients (in the absence of a contraindication)  Caprini Score Greater than or = 7:  High Risk, pharmacologic VTE prophylaxis is indicated for most patients (in the absence of a contraindication)

## 2021-02-10 NOTE — H&P PST ADULT - NSICDXPASTMEDICALHX_GEN_ALL_CORE_FT
PAST MEDICAL HISTORY:  Current smoker     Pelvic and perineal pain     Unspecified fracture of sacrum, subsequent encounter for fracture with routine healing

## 2021-02-10 NOTE — H&P PST ADULT - MUSCULOSKELETAL
details… No joint pain, swelling or deformity; no limitation of movement right LE/decreased ROM due to pain/diminished strength detailed exam

## 2021-02-10 NOTE — H&P PST ADULT - NSICDXPASTSURGICALHX_GEN_ALL_CORE_FT
PAST SURGICAL HISTORY:  H/O fracture of pelvis 2/25/2019, percutaneous screws placement 3/26/2019

## 2021-02-10 NOTE — H&P PST ADULT - HISTORY OF PRESENT ILLNESS
46 yo M s/p dramatic pelvic ring injury with continued radiculopathy as well as left lateral hip pain. MRI of the lumbar spine and sacrum shows impingement on the S1 nerve roots which may be causing some of his pain. As he still having pain despite a extensive period of conservative management, I offered to remove the sacroiliac screws and he wishes to proceed. We discussed that it is difficult to ascertain how much pain relief he will have as hardware removal is notoriously variable in its pain relief. If he continues to have pain after removing the implants, I would recommend sacroiliac joint injection as both a diagnostic and therapeutic modality to see if SI fusion would be beneficial. 44 yo M current smoker (0.5 pk/day x20 yrs) s/p dramatic pelvic ring injury on 2/25/2019 with continued radiculopathy as well as left lateral hip pain. MRI of the lumbar spine and sacrum shows impingement on the S1 nerve roots which may be causing some of his pain. Pt reports he had 2 epidural shots w/o significant relief. Preop assessment prior to removal of deep implants in pelvis w/Dr Godinez on 2/17 44 yo M current smoker (0.5 pk/day x20 yrs) s/p dramatic pelvic ring injury on 2/25/2019 and percutaneous screws placement on 3/26/2019 c/o continued severe radiculopathy in right LE, right foot and right big toe, lumbosacral spine pain and bilateral hip pain. Pt describes his pain as constant and severe, 10/10 pain level at its worst. MRI of the lumbar spine and sacrum shows impingement on the S1 nerve roots. Pt reports he had 2 epidural shots w/o significant relief. He takes tylenol w/o significant relief. Ambulates w/cane. Preop assessment prior to removal of deep implants in pelvis w/Dr Godinez on 2/17   44 yo M current smoker (0.5 pk/day x20 yrs) s/p dramatic pelvic ring injury on 2/25/2019 followed by percutaneous screws placement on 3/26/2019 presents with c/o continued severe radiculopathy in right LE, right foot and right big toe, lumbosacral spine pain and bilateral hip pain. Pt describes his pain as constant and severe, 10/10 pain level at its worst. MRI of the lumbar spine and sacrum shows impingement on the S1 nerve roots. Pt reports he had 2 epidural shots w/o significant relief. He takes tylenol w/o significant relief. Ambulates w/cane. Preop assessment prior to removal of deep implants in pelvis w/Dr Godinez on 2/17

## 2021-02-14 ENCOUNTER — LABORATORY RESULT (OUTPATIENT)
Age: 46
End: 2021-02-14

## 2021-02-14 ENCOUNTER — APPOINTMENT (OUTPATIENT)
Dept: DISASTER EMERGENCY | Facility: CLINIC | Age: 46
End: 2021-02-14

## 2021-02-16 ENCOUNTER — TRANSCRIPTION ENCOUNTER (OUTPATIENT)
Age: 46
End: 2021-02-16

## 2021-02-17 ENCOUNTER — APPOINTMENT (OUTPATIENT)
Dept: ORTHOPEDIC SURGERY | Facility: HOSPITAL | Age: 46
End: 2021-02-17

## 2021-02-17 ENCOUNTER — OUTPATIENT (OUTPATIENT)
Dept: INPATIENT UNIT | Facility: HOSPITAL | Age: 46
LOS: 1 days | End: 2021-02-17
Payer: COMMERCIAL

## 2021-02-17 ENCOUNTER — RESULT REVIEW (OUTPATIENT)
Age: 46
End: 2021-02-17

## 2021-02-17 VITALS
HEIGHT: 71 IN | TEMPERATURE: 97 F | WEIGHT: 184.09 LBS | OXYGEN SATURATION: 100 % | RESPIRATION RATE: 16 BRPM | HEART RATE: 60 BPM | DIASTOLIC BLOOD PRESSURE: 88 MMHG | SYSTOLIC BLOOD PRESSURE: 128 MMHG

## 2021-02-17 VITALS
SYSTOLIC BLOOD PRESSURE: 144 MMHG | OXYGEN SATURATION: 100 % | HEART RATE: 77 BPM | TEMPERATURE: 98 F | RESPIRATION RATE: 18 BRPM | DIASTOLIC BLOOD PRESSURE: 70 MMHG

## 2021-02-17 DIAGNOSIS — Z87.81 PERSONAL HISTORY OF (HEALED) TRAUMATIC FRACTURE: Chronic | ICD-10-CM

## 2021-02-17 DIAGNOSIS — R10.2 PELVIC AND PERINEAL PAIN: ICD-10-CM

## 2021-02-17 DIAGNOSIS — S32.10XD UNSPECIFIED FRACTURE OF SACRUM, SUBSEQUENT ENCOUNTER FOR FRACTURE WITH ROUTINE HEALING: ICD-10-CM

## 2021-02-17 PROCEDURE — 27280 ARTHR SI JT OPN B1GRF INSTRM: CPT | Mod: LT

## 2021-02-17 PROCEDURE — 76000 FLUOROSCOPY <1 HR PHYS/QHP: CPT

## 2021-02-17 PROCEDURE — 72170 X-RAY EXAM OF PELVIS: CPT

## 2021-02-17 PROCEDURE — 20680 REMOVAL OF IMPLANT DEEP: CPT

## 2021-02-17 PROCEDURE — 20680 REMOVAL OF IMPLANT DEEP: CPT | Mod: LT

## 2021-02-17 PROCEDURE — C1889: CPT

## 2021-02-17 PROCEDURE — 72170 X-RAY EXAM OF PELVIS: CPT | Mod: 26

## 2021-02-17 PROCEDURE — 27279 ARTHRD SI JT PLMT TARTCLR DV: CPT | Mod: LT

## 2021-02-17 RX ORDER — DOCUSATE SODIUM 100 MG
1 CAPSULE ORAL
Qty: 42 | Refills: 0
Start: 2021-02-17 | End: 2021-03-02

## 2021-02-17 RX ORDER — FENTANYL CITRATE 50 UG/ML
50 INJECTION INTRAVENOUS
Refills: 0 | Status: DISCONTINUED | OUTPATIENT
Start: 2021-02-17 | End: 2021-02-17

## 2021-02-17 RX ORDER — SODIUM CHLORIDE 9 MG/ML
1000 INJECTION, SOLUTION INTRAVENOUS
Refills: 0 | Status: DISCONTINUED | OUTPATIENT
Start: 2021-02-17 | End: 2021-03-03

## 2021-02-17 RX ORDER — ASPIRIN/CALCIUM CARB/MAGNESIUM 324 MG
1 TABLET ORAL
Qty: 30 | Refills: 0
Start: 2021-02-17 | End: 2021-03-18

## 2021-02-17 RX ORDER — OXYCODONE HYDROCHLORIDE 5 MG/1
1 TABLET ORAL
Qty: 16 | Refills: 0
Start: 2021-02-17 | End: 2021-02-20

## 2021-02-17 RX ORDER — SODIUM CHLORIDE 9 MG/ML
1000 INJECTION, SOLUTION INTRAVENOUS
Refills: 0 | Status: DISCONTINUED | OUTPATIENT
Start: 2021-02-17 | End: 2021-02-17

## 2021-02-17 RX ORDER — ASPIRIN/CALCIUM CARB/MAGNESIUM 324 MG
1 TABLET ORAL
Qty: 60 | Refills: 0
Start: 2021-02-17 | End: 2021-03-18

## 2021-02-17 RX ORDER — ONDANSETRON 8 MG/1
4 TABLET, FILM COATED ORAL ONCE
Refills: 0 | Status: DISCONTINUED | OUTPATIENT
Start: 2021-02-17 | End: 2021-02-17

## 2021-02-17 RX ORDER — CEFAZOLIN SODIUM 1 G
2000 VIAL (EA) INJECTION ONCE
Refills: 0 | Status: DISCONTINUED | OUTPATIENT
Start: 2021-02-17 | End: 2021-02-17

## 2021-02-17 RX ADMIN — FENTANYL CITRATE 50 MICROGRAM(S): 50 INJECTION INTRAVENOUS at 10:05

## 2021-02-17 RX ADMIN — FENTANYL CITRATE 50 MICROGRAM(S): 50 INJECTION INTRAVENOUS at 09:56

## 2021-02-17 NOTE — BRIEF OPERATIVE NOTE - NSICDXBRIEFPROCEDURE_GEN_ALL_CORE_FT
PROCEDURES:  Arthrodesis of SI joint 17-Feb-2021 09:49:06  Mitch Su  Removal of hardware from pelvic bone or sacrum 17-Feb-2021 09:48:29  Mitch Su

## 2021-02-17 NOTE — ASU DISCHARGE PLAN (ADULT/PEDIATRIC) - CARE PROVIDER_API CALL
Howie Godinez)  Orthopaedic Surgery  [address] [place] [address]  Phone: [phone]  Fax: [phone]  Follow Up Time:

## 2021-02-17 NOTE — ASU DISCHARGE PLAN (ADULT/PEDIATRIC) - CALL YOUR DOCTOR IF YOU HAVE ANY OF THE FOLLOWING:
Bleeding that does not stop/Wound/Surgical Site with redness, or foul smelling discharge or pus/Numbness, tingling, color or temperature change to extremity/Nausea and vomiting that does not stop

## 2021-02-17 NOTE — ASU DISCHARGE PLAN (ADULT/PEDIATRIC) - ASU DC SPECIAL INSTRUCTIONSFT
weight-bearing as tolerated  take aspirin 325mg once daily for 30 days for dvt ppx. do not skip doses.  follow up in 3-4 weeks, call office for appointment, [phone]

## 2021-03-01 PROBLEM — S32.10XD UNSPECIFIED FRACTURE OF SACRUM, SUBSEQUENT ENCOUNTER FOR FRACTURE WITH ROUTINE HEALING: Chronic | Status: ACTIVE | Noted: 2021-02-10

## 2021-03-01 PROBLEM — F17.200 NICOTINE DEPENDENCE, UNSPECIFIED, UNCOMPLICATED: Chronic | Status: ACTIVE | Noted: 2021-02-10

## 2021-03-01 PROBLEM — R10.2 PELVIC AND PERINEAL PAIN: Chronic | Status: ACTIVE | Noted: 2021-02-10

## 2021-03-15 ENCOUNTER — APPOINTMENT (OUTPATIENT)
Dept: ORTHOPEDIC SURGERY | Facility: CLINIC | Age: 46
End: 2021-03-15
Payer: OTHER MISCELLANEOUS

## 2021-03-15 DIAGNOSIS — M54.16 RADICULOPATHY, LUMBAR REGION: ICD-10-CM

## 2021-03-15 DIAGNOSIS — S32.10XD UNSPECIFIED FRACTURE OF SACRUM, SUBSEQUENT ENCOUNTER FOR FRACTURE WITH ROUTINE HEALING: ICD-10-CM

## 2021-03-15 PROCEDURE — 99024 POSTOP FOLLOW-UP VISIT: CPT

## 2021-03-15 PROCEDURE — 72190 X-RAY EXAM OF PELVIS: CPT

## 2021-03-15 RX ORDER — GABAPENTIN 300 MG/1
300 CAPSULE ORAL
Qty: 90 | Refills: 0 | Status: ACTIVE | COMMUNITY
Start: 2021-03-15 | End: 1900-01-01

## 2021-03-15 NOTE — HISTORY OF PRESENT ILLNESS
[3] : the patient reports pain that is 3/10 in severity [Chills] : no chills [Constipation] : no constipation [Diarrhea] : no diarrhea [Dysuria] : no dysuria [Fever] : no fever [Nausea] : no nausea [Vomiting] : no vomiting [Clean/Dry/Intact] : clean, dry and intact [Healed] : healed [Erythema] : not erythematous [Discharge] : absent of discharge [Swelling] : swollen [Neuro Intact] : an unremarkable neurological exam [Vascular Intact] : ~T peripheral vascular exam normal [Doing Well] : is doing well [No Sign of Infection] : is showing no signs of infection [Adequate Pain Control] : has adequate pain control [de-identified] : left hip pelvis joint fusion DOS 02/17/2021 [de-identified] : The patient is a very pleasant 45-year-old male who returns today for postoperative follow-up after removal of implants pelvis and left sacroiliac joint fusion.  He is ambulating with a cane.  He states the burning in the right foot has unfortunately worsened since surgery.  Other than that his pain is minimal. [de-identified] : Physical Exam:\par General: Well appearing, no acute distress, A&O\par Neurologic: A&Ox3, No focal deficits\par Head: NCAT without abrasions, lacerations, or ecchymosis to head, face, or scalp\par Respiratory: Equal chest wall expansion bilaterally, no accessory muscle use\par Lymphatic: No lymphadenopathy palpated\par Skin: Warm and dry\par Psychiatric: Normal mood and affect [de-identified] : 3 views of the pelvis obtained today show removal of previously seen long screws from the pelvis and placement of sacroiliac fusion screws [de-identified] : He appears to be healing well from surgery but he still has signs of nerve related pain on the right side of the pelvis despite screw removal.  Hopefully the residual irritation of those nerve roots will continue to improve.  We will order gabapentin which may help with some of the pain.  I would like to have a repeat evaluation likely over the phone in about a month or 6 weeks to see how he is improving.  Physical therapy could also be beneficial.\par \par He may need some additional time off from work due to the requirements of his job, due to this I would consider him temporarily 100% disabled\par \par The patient was given the opportunity to ask questions and all questions were answered to their satisfaction.\par \par Howie Godinez MD\par Orthopaedic Trauma Surgeon\par [facility]\par [facility]\par Director Orthopaedic Trauma, [facility]\par \par \par \par

## 2022-03-15 NOTE — DISCHARGE NOTE PROVIDER - NSDCHOSPICE_GEN_A_CORE
No Otolaryngologist Procedure Text (A): After obtaining clear surgical margins the patient was sent to otolaryngology for surgical repair.  The patient understands they will receive post-surgical care and follow-up from the referring physician's office.

## 2022-09-27 NOTE — ED PROVIDER NOTE - PMH
No pertinent past medical history <<----- Click to add NO pertinent Past Medical History PAST SURGICAL HISTORY:  H/O: hysterectomy     Previous back surgery     S/P appendectomy